# Patient Record
Sex: MALE | Race: BLACK OR AFRICAN AMERICAN | Employment: FULL TIME | URBAN - METROPOLITAN AREA
[De-identification: names, ages, dates, MRNs, and addresses within clinical notes are randomized per-mention and may not be internally consistent; named-entity substitution may affect disease eponyms.]

---

## 2021-10-12 ENCOUNTER — HOSPITAL ENCOUNTER (OUTPATIENT)
Dept: GENERAL RADIOLOGY | Age: 31
Discharge: HOME | End: 2021-10-14
Payer: COMMERCIAL

## 2021-10-12 ENCOUNTER — HOSPITAL ENCOUNTER (OUTPATIENT)
Age: 31
Discharge: HOME | End: 2021-10-14
Payer: COMMERCIAL

## 2021-10-12 DIAGNOSIS — M25.561 RIGHT KNEE PAIN, UNSPECIFIED CHRONICITY: ICD-10-CM

## 2021-10-12 PROCEDURE — 73562 X-RAY EXAM OF KNEE 3: CPT

## 2022-05-31 ENCOUNTER — HOSPITAL ENCOUNTER (OUTPATIENT)
Age: 32
Setting detail: OBSERVATION
Discharge: HOME | End: 2022-06-01
Attending: GENERAL PRACTICE | Admitting: ORTHOPAEDIC SURGERY
Payer: COMMERCIAL

## 2022-05-31 ENCOUNTER — APPOINTMENT (OUTPATIENT)
Dept: GENERAL RADIOLOGY | Age: 32
End: 2022-05-31
Payer: COMMERCIAL

## 2022-05-31 ENCOUNTER — ANESTHESIA EVENT (OUTPATIENT)
Dept: OPERATING ROOM | Age: 32
End: 2022-05-31
Payer: COMMERCIAL

## 2022-05-31 ENCOUNTER — ANESTHESIA (OUTPATIENT)
Dept: OPERATING ROOM | Age: 32
End: 2022-05-31
Payer: COMMERCIAL

## 2022-05-31 DIAGNOSIS — S68.021A: Primary | ICD-10-CM

## 2022-05-31 PROBLEM — S65.311A LACERATION OF DEEP PALMAR ARCH OF RIGHT HAND: Status: ACTIVE | Noted: 2022-05-31

## 2022-05-31 PROBLEM — S68.521A: Status: ACTIVE | Noted: 2022-05-31

## 2022-05-31 PROBLEM — S65.411A: Status: ACTIVE | Noted: 2022-05-31

## 2022-05-31 LAB
ABO/RH: NORMAL
ALBUMIN SERPL-MCNC: 4.4 G/DL (ref 3.5–5.2)
ALBUMIN SERPL-MCNC: 4.7 G/DL (ref 3.5–5.2)
ALP BLD-CCNC: 45 U/L (ref 40–129)
ALP BLD-CCNC: 55 U/L (ref 40–129)
ALT SERPL-CCNC: 54 U/L (ref 0–40)
ALT SERPL-CCNC: 63 U/L (ref 0–40)
ANION GAP SERPL CALCULATED.3IONS-SCNC: 11 MMOL/L (ref 7–16)
ANION GAP SERPL CALCULATED.3IONS-SCNC: 17 MMOL/L (ref 7–16)
ANTIBODY SCREEN: NORMAL
AST SERPL-CCNC: 30 U/L (ref 0–39)
AST SERPL-CCNC: 33 U/L (ref 0–39)
BASOPHILS ABSOLUTE: 0.06 E9/L (ref 0–0.2)
BASOPHILS RELATIVE PERCENT: 0.7 % (ref 0–2)
BILIRUB SERPL-MCNC: 0.4 MG/DL (ref 0–1.2)
BILIRUB SERPL-MCNC: <0.2 MG/DL (ref 0–1.2)
BUN BLDV-MCNC: 14 MG/DL (ref 6–20)
BUN BLDV-MCNC: 17 MG/DL (ref 6–20)
CALCIUM SERPL-MCNC: 8.2 MG/DL (ref 8.6–10.2)
CALCIUM SERPL-MCNC: 9.6 MG/DL (ref 8.6–10.2)
CHLORIDE BLD-SCNC: 104 MMOL/L (ref 98–107)
CHLORIDE BLD-SCNC: 105 MMOL/L (ref 98–107)
CO2: 20 MMOL/L (ref 22–29)
CO2: 23 MMOL/L (ref 22–29)
CREAT SERPL-MCNC: 0.8 MG/DL (ref 0.7–1.2)
CREAT SERPL-MCNC: 1 MG/DL (ref 0.7–1.2)
EOSINOPHILS ABSOLUTE: 0.33 E9/L (ref 0.05–0.5)
EOSINOPHILS RELATIVE PERCENT: 4 % (ref 0–6)
GFR AFRICAN AMERICAN: >60
GFR AFRICAN AMERICAN: >60
GFR NON-AFRICAN AMERICAN: >60 ML/MIN/1.73
GFR NON-AFRICAN AMERICAN: >60 ML/MIN/1.73
GLUCOSE BLD-MCNC: 131 MG/DL (ref 74–99)
GLUCOSE BLD-MCNC: 143 MG/DL (ref 74–99)
HCT VFR BLD CALC: 41.7 % (ref 37–54)
HEMOGLOBIN: 14.8 G/DL (ref 12.5–16.5)
IMMATURE GRANULOCYTES #: 0.03 E9/L
IMMATURE GRANULOCYTES %: 0.4 % (ref 0–5)
INR BLD: 1.1
LYMPHOCYTES ABSOLUTE: 3.22 E9/L (ref 1.5–4)
LYMPHOCYTES RELATIVE PERCENT: 39.4 % (ref 20–42)
MCH RBC QN AUTO: 30 PG (ref 26–35)
MCHC RBC AUTO-ENTMCNC: 35.5 % (ref 32–34.5)
MCV RBC AUTO: 84.4 FL (ref 80–99.9)
MONOCYTES ABSOLUTE: 0.74 E9/L (ref 0.1–0.95)
MONOCYTES RELATIVE PERCENT: 9 % (ref 2–12)
NEUTROPHILS ABSOLUTE: 3.8 E9/L (ref 1.8–7.3)
NEUTROPHILS RELATIVE PERCENT: 46.5 % (ref 43–80)
PDW BLD-RTO: 12.4 FL (ref 11.5–15)
PLATELET # BLD: 241 E9/L (ref 130–450)
PMV BLD AUTO: 10.7 FL (ref 7–12)
POTASSIUM REFLEX MAGNESIUM: 3.7 MMOL/L (ref 3.5–5)
POTASSIUM SERPL-SCNC: 4.2 MMOL/L (ref 3.5–5)
PROTHROMBIN TIME: 11.4 SEC (ref 9.3–12.4)
RBC # BLD: 4.94 E12/L (ref 3.8–5.8)
SODIUM BLD-SCNC: 139 MMOL/L (ref 132–146)
SODIUM BLD-SCNC: 141 MMOL/L (ref 132–146)
TOTAL PROTEIN: 6.7 G/DL (ref 6.4–8.3)
TOTAL PROTEIN: 7.7 G/DL (ref 6.4–8.3)
WBC # BLD: 8.2 E9/L (ref 4.5–11.5)

## 2022-05-31 PROCEDURE — 86850 RBC ANTIBODY SCREEN: CPT

## 2022-05-31 PROCEDURE — 90471 IMMUNIZATION ADMIN: CPT | Performed by: GENERAL PRACTICE

## 2022-05-31 PROCEDURE — 2580000003 HC RX 258: Performed by: GENERAL PRACTICE

## 2022-05-31 PROCEDURE — 2580000003 HC RX 258: Performed by: NURSE ANESTHETIST, CERTIFIED REGISTERED

## 2022-05-31 PROCEDURE — 11043 DBRDMT MUSC&/FSCA 1ST 20/<: CPT | Performed by: ORTHOPAEDIC SURGERY

## 2022-05-31 PROCEDURE — 2580000003 HC RX 258: Performed by: PHYSICAL THERAPY ASSISTANT

## 2022-05-31 PROCEDURE — C1763 CONN TISS, NON-HUMAN: HCPCS | Performed by: ORTHOPAEDIC SURGERY

## 2022-05-31 PROCEDURE — 80053 COMPREHEN METABOLIC PANEL: CPT

## 2022-05-31 PROCEDURE — 3700000000 HC ANESTHESIA ATTENDED CARE: Performed by: ORTHOPAEDIC SURGERY

## 2022-05-31 PROCEDURE — 64913 NRV RPR W/NRV ALGRFT EA ADDL: CPT | Performed by: ORTHOPAEDIC SURGERY

## 2022-05-31 PROCEDURE — 64912 NRV RPR W/NRV ALGRFT 1ST: CPT | Performed by: ORTHOPAEDIC SURGERY

## 2022-05-31 PROCEDURE — 26350 REPAIR FINGER/HAND TENDON: CPT | Performed by: ORTHOPAEDIC SURGERY

## 2022-05-31 PROCEDURE — 35207 RPR BLD VSL DIR HAND FINGER: CPT | Performed by: ORTHOPAEDIC SURGERY

## 2022-05-31 PROCEDURE — 6360000002 HC RX W HCPCS: Performed by: NURSE ANESTHETIST, CERTIFIED REGISTERED

## 2022-05-31 PROCEDURE — 99285 EMERGENCY DEPT VISIT HI MDM: CPT

## 2022-05-31 PROCEDURE — 6360000002 HC RX W HCPCS: Performed by: PHYSICAL THERAPY ASSISTANT

## 2022-05-31 PROCEDURE — 6370000000 HC RX 637 (ALT 250 FOR IP)

## 2022-05-31 PROCEDURE — 6360000002 HC RX W HCPCS

## 2022-05-31 PROCEDURE — 6360000002 HC RX W HCPCS: Performed by: ORTHOPAEDIC SURGERY

## 2022-05-31 PROCEDURE — 7100000001 HC PACU RECOVERY - ADDTL 15 MIN: Performed by: ORTHOPAEDIC SURGERY

## 2022-05-31 PROCEDURE — 7100000000 HC PACU RECOVERY - FIRST 15 MIN: Performed by: ORTHOPAEDIC SURGERY

## 2022-05-31 PROCEDURE — 86900 BLOOD TYPING SEROLOGIC ABO: CPT

## 2022-05-31 PROCEDURE — 96375 TX/PRO/DX INJ NEW DRUG ADDON: CPT

## 2022-05-31 PROCEDURE — 85610 PROTHROMBIN TIME: CPT

## 2022-05-31 PROCEDURE — 3600000005 HC SURGERY LEVEL 5 BASE: Performed by: ORTHOPAEDIC SURGERY

## 2022-05-31 PROCEDURE — 90714 TD VACC NO PRESV 7 YRS+ IM: CPT | Performed by: GENERAL PRACTICE

## 2022-05-31 PROCEDURE — 6370000000 HC RX 637 (ALT 250 FOR IP): Performed by: PHYSICAL THERAPY ASSISTANT

## 2022-05-31 PROCEDURE — 36415 COLL VENOUS BLD VENIPUNCTURE: CPT

## 2022-05-31 PROCEDURE — 26591 REPAIR MUSCLES OF HAND: CPT | Performed by: ORTHOPAEDIC SURGERY

## 2022-05-31 PROCEDURE — 86901 BLOOD TYPING SEROLOGIC RH(D): CPT

## 2022-05-31 PROCEDURE — 2500000003 HC RX 250 WO HCPCS: Performed by: EMERGENCY MEDICINE

## 2022-05-31 PROCEDURE — 3700000001 HC ADD 15 MINUTES (ANESTHESIA): Performed by: ORTHOPAEDIC SURGERY

## 2022-05-31 PROCEDURE — 2709999900 HC NON-CHARGEABLE SUPPLY: Performed by: ORTHOPAEDIC SURGERY

## 2022-05-31 PROCEDURE — G0378 HOSPITAL OBSERVATION PER HR: HCPCS

## 2022-05-31 PROCEDURE — 6360000002 HC RX W HCPCS: Performed by: GENERAL PRACTICE

## 2022-05-31 PROCEDURE — 99284 EMERGENCY DEPT VISIT MOD MDM: CPT | Performed by: SURGERY

## 2022-05-31 PROCEDURE — 3600000015 HC SURGERY LEVEL 5 ADDTL 15MIN: Performed by: ORTHOPAEDIC SURGERY

## 2022-05-31 PROCEDURE — 96376 TX/PRO/DX INJ SAME DRUG ADON: CPT

## 2022-05-31 PROCEDURE — 73130 X-RAY EXAM OF HAND: CPT

## 2022-05-31 PROCEDURE — 85025 COMPLETE CBC W/AUTO DIFF WBC: CPT

## 2022-05-31 PROCEDURE — 96374 THER/PROPH/DIAG INJ IV PUSH: CPT

## 2022-05-31 PROCEDURE — 2500000003 HC RX 250 WO HCPCS: Performed by: NURSE ANESTHETIST, CERTIFIED REGISTERED

## 2022-05-31 DEVICE — CONNECTOR NRV L15MM DIA3MM PORCINE EXTRACELLULAR MTRX: Type: IMPLANTABLE DEVICE | Status: FUNCTIONAL

## 2022-05-31 DEVICE — CONNECTOR NRV L15MM DIA2MM PORCINE EXTRACELLULAR MTRX: Type: IMPLANTABLE DEVICE | Status: FUNCTIONAL

## 2022-05-31 DEVICE — IMPLANTABLE DEVICE: Type: IMPLANTABLE DEVICE | Status: FUNCTIONAL

## 2022-05-31 RX ORDER — OXYCODONE HYDROCHLORIDE AND ACETAMINOPHEN 5; 325 MG/1; MG/1
1 TABLET ORAL EVERY 6 HOURS PRN
Status: DISCONTINUED | OUTPATIENT
Start: 2022-05-31 | End: 2022-05-31

## 2022-05-31 RX ORDER — LABETALOL HYDROCHLORIDE 5 MG/ML
10 INJECTION, SOLUTION INTRAVENOUS ONCE
Status: COMPLETED | OUTPATIENT
Start: 2022-05-31 | End: 2022-05-31

## 2022-05-31 RX ORDER — HYDRALAZINE HYDROCHLORIDE 20 MG/ML
10 INJECTION INTRAMUSCULAR; INTRAVENOUS
Status: DISCONTINUED | OUTPATIENT
Start: 2022-05-31 | End: 2022-05-31 | Stop reason: HOSPADM

## 2022-05-31 RX ORDER — MORPHINE SULFATE 2 MG/ML
2 INJECTION, SOLUTION INTRAMUSCULAR; INTRAVENOUS EVERY 5 MIN PRN
Status: DISCONTINUED | OUTPATIENT
Start: 2022-05-31 | End: 2022-05-31 | Stop reason: HOSPADM

## 2022-05-31 RX ORDER — FENTANYL CITRATE 50 UG/ML
INJECTION, SOLUTION INTRAMUSCULAR; INTRAVENOUS PRN
Status: DISCONTINUED | OUTPATIENT
Start: 2022-05-31 | End: 2022-05-31 | Stop reason: SDUPTHER

## 2022-05-31 RX ORDER — 0.9 % SODIUM CHLORIDE 0.9 %
1000 INTRAVENOUS SOLUTION INTRAVENOUS ONCE
Status: COMPLETED | OUTPATIENT
Start: 2022-05-31 | End: 2022-05-31

## 2022-05-31 RX ORDER — MORPHINE SULFATE 4 MG/ML
4 INJECTION, SOLUTION INTRAMUSCULAR; INTRAVENOUS ONCE
Status: COMPLETED | OUTPATIENT
Start: 2022-05-31 | End: 2022-05-31

## 2022-05-31 RX ORDER — ONDANSETRON 2 MG/ML
4 INJECTION INTRAMUSCULAR; INTRAVENOUS
Status: DISCONTINUED | OUTPATIENT
Start: 2022-05-31 | End: 2022-05-31 | Stop reason: HOSPADM

## 2022-05-31 RX ORDER — MEPERIDINE HYDROCHLORIDE 25 MG/ML
12.5 INJECTION INTRAMUSCULAR; INTRAVENOUS; SUBCUTANEOUS EVERY 5 MIN PRN
Status: DISCONTINUED | OUTPATIENT
Start: 2022-05-31 | End: 2022-05-31 | Stop reason: HOSPADM

## 2022-05-31 RX ORDER — HEPARIN SODIUM 10000 [USP'U]/ML
INJECTION, SOLUTION INTRAVENOUS; SUBCUTANEOUS PRN
Status: DISCONTINUED | OUTPATIENT
Start: 2022-05-31 | End: 2022-05-31 | Stop reason: ALTCHOICE

## 2022-05-31 RX ORDER — SODIUM CHLORIDE 9 MG/ML
INJECTION, SOLUTION INTRAVENOUS PRN
Status: DISCONTINUED | OUTPATIENT
Start: 2022-05-31 | End: 2022-06-01 | Stop reason: HOSPADM

## 2022-05-31 RX ORDER — IPRATROPIUM BROMIDE AND ALBUTEROL SULFATE 2.5; .5 MG/3ML; MG/3ML
1 SOLUTION RESPIRATORY (INHALATION)
Status: DISCONTINUED | OUTPATIENT
Start: 2022-05-31 | End: 2022-05-31 | Stop reason: HOSPADM

## 2022-05-31 RX ORDER — FENTANYL CITRATE 50 UG/ML
50 INJECTION, SOLUTION INTRAMUSCULAR; INTRAVENOUS ONCE
Status: COMPLETED | OUTPATIENT
Start: 2022-05-31 | End: 2022-05-31

## 2022-05-31 RX ORDER — LORAZEPAM 2 MG/ML
0.5 INJECTION INTRAMUSCULAR
Status: DISCONTINUED | OUTPATIENT
Start: 2022-05-31 | End: 2022-05-31 | Stop reason: HOSPADM

## 2022-05-31 RX ORDER — NEOSTIGMINE METHYLSULFATE 1 MG/ML
INJECTION, SOLUTION INTRAVENOUS PRN
Status: DISCONTINUED | OUTPATIENT
Start: 2022-05-31 | End: 2022-05-31 | Stop reason: SDUPTHER

## 2022-05-31 RX ORDER — ONDANSETRON 2 MG/ML
4 INJECTION INTRAMUSCULAR; INTRAVENOUS ONCE
Status: COMPLETED | OUTPATIENT
Start: 2022-05-31 | End: 2022-05-31

## 2022-05-31 RX ORDER — CEFAZOLIN SODIUM 1 G/3ML
INJECTION, POWDER, FOR SOLUTION INTRAMUSCULAR; INTRAVENOUS PRN
Status: DISCONTINUED | OUTPATIENT
Start: 2022-05-31 | End: 2022-05-31 | Stop reason: SDUPTHER

## 2022-05-31 RX ORDER — MIDAZOLAM HYDROCHLORIDE 1 MG/ML
INJECTION INTRAMUSCULAR; INTRAVENOUS PRN
Status: DISCONTINUED | OUTPATIENT
Start: 2022-05-31 | End: 2022-05-31 | Stop reason: SDUPTHER

## 2022-05-31 RX ORDER — SODIUM CHLORIDE 9 MG/ML
INJECTION, SOLUTION INTRAVENOUS CONTINUOUS PRN
Status: DISCONTINUED | OUTPATIENT
Start: 2022-05-31 | End: 2022-05-31 | Stop reason: SDUPTHER

## 2022-05-31 RX ORDER — MORPHINE SULFATE 2 MG/ML
2 INJECTION, SOLUTION INTRAMUSCULAR; INTRAVENOUS
Status: ACTIVE | OUTPATIENT
Start: 2022-05-31 | End: 2022-06-01

## 2022-05-31 RX ORDER — SODIUM CHLORIDE 0.9 % (FLUSH) 0.9 %
5-40 SYRINGE (ML) INJECTION EVERY 12 HOURS SCHEDULED
Status: DISCONTINUED | OUTPATIENT
Start: 2022-05-31 | End: 2022-06-01 | Stop reason: HOSPADM

## 2022-05-31 RX ORDER — FENTANYL CITRATE 50 UG/ML
INJECTION, SOLUTION INTRAMUSCULAR; INTRAVENOUS
Status: COMPLETED
Start: 2022-05-31 | End: 2022-05-31

## 2022-05-31 RX ORDER — ASPIRIN 325 MG
325 TABLET, DELAYED RELEASE (ENTERIC COATED) ORAL DAILY
Qty: 30 TABLET | Refills: 0 | Status: SHIPPED | OUTPATIENT
Start: 2022-05-31 | End: 2022-06-30

## 2022-05-31 RX ORDER — LABETALOL HYDROCHLORIDE 5 MG/ML
10 INJECTION, SOLUTION INTRAVENOUS
Status: DISCONTINUED | OUTPATIENT
Start: 2022-05-31 | End: 2022-05-31 | Stop reason: HOSPADM

## 2022-05-31 RX ORDER — PROCHLORPERAZINE EDISYLATE 5 MG/ML
5 INJECTION INTRAMUSCULAR; INTRAVENOUS
Status: DISCONTINUED | OUTPATIENT
Start: 2022-05-31 | End: 2022-05-31 | Stop reason: HOSPADM

## 2022-05-31 RX ORDER — OXYCODONE HYDROCHLORIDE 10 MG/1
10 TABLET ORAL EVERY 4 HOURS PRN
Status: DISCONTINUED | OUTPATIENT
Start: 2022-05-31 | End: 2022-06-01 | Stop reason: HOSPADM

## 2022-05-31 RX ORDER — ROCURONIUM BROMIDE 10 MG/ML
INJECTION, SOLUTION INTRAVENOUS PRN
Status: DISCONTINUED | OUTPATIENT
Start: 2022-05-31 | End: 2022-05-31 | Stop reason: SDUPTHER

## 2022-05-31 RX ORDER — ONDANSETRON 2 MG/ML
INJECTION INTRAMUSCULAR; INTRAVENOUS PRN
Status: DISCONTINUED | OUTPATIENT
Start: 2022-05-31 | End: 2022-05-31 | Stop reason: SDUPTHER

## 2022-05-31 RX ORDER — SODIUM CHLORIDE 0.9 % (FLUSH) 0.9 %
5-40 SYRINGE (ML) INJECTION PRN
Status: DISCONTINUED | OUTPATIENT
Start: 2022-05-31 | End: 2022-06-01 | Stop reason: HOSPADM

## 2022-05-31 RX ORDER — KETOROLAC TROMETHAMINE 30 MG/ML
30 INJECTION, SOLUTION INTRAMUSCULAR; INTRAVENOUS
Status: DISCONTINUED | OUTPATIENT
Start: 2022-05-31 | End: 2022-05-31 | Stop reason: HOSPADM

## 2022-05-31 RX ORDER — DEXAMETHASONE SODIUM PHOSPHATE 10 MG/ML
INJECTION INTRAMUSCULAR; INTRAVENOUS PRN
Status: DISCONTINUED | OUTPATIENT
Start: 2022-05-31 | End: 2022-05-31 | Stop reason: SDUPTHER

## 2022-05-31 RX ORDER — DIPHENHYDRAMINE HYDROCHLORIDE 50 MG/ML
12.5 INJECTION INTRAMUSCULAR; INTRAVENOUS
Status: DISCONTINUED | OUTPATIENT
Start: 2022-05-31 | End: 2022-05-31 | Stop reason: HOSPADM

## 2022-05-31 RX ORDER — MORPHINE SULFATE 4 MG/ML
4 INJECTION, SOLUTION INTRAMUSCULAR; INTRAVENOUS
Status: ACTIVE | OUTPATIENT
Start: 2022-05-31 | End: 2022-06-01

## 2022-05-31 RX ORDER — TETANUS AND DIPHTHERIA TOXOIDS ADSORBED 2; 2 [LF]/.5ML; [LF]/.5ML
0.5 INJECTION INTRAMUSCULAR ONCE
Status: COMPLETED | OUTPATIENT
Start: 2022-05-31 | End: 2022-05-31

## 2022-05-31 RX ORDER — ONDANSETRON 4 MG/1
4 TABLET, ORALLY DISINTEGRATING ORAL EVERY 8 HOURS PRN
Status: DISCONTINUED | OUTPATIENT
Start: 2022-05-31 | End: 2022-06-01 | Stop reason: HOSPADM

## 2022-05-31 RX ORDER — ACETAMINOPHEN 325 MG/1
650 TABLET ORAL
Status: DISCONTINUED | OUTPATIENT
Start: 2022-05-31 | End: 2022-06-01 | Stop reason: HOSPADM

## 2022-05-31 RX ORDER — LIDOCAINE HYDROCHLORIDE 20 MG/ML
INJECTION, SOLUTION INTRAVENOUS PRN
Status: DISCONTINUED | OUTPATIENT
Start: 2022-05-31 | End: 2022-05-31 | Stop reason: SDUPTHER

## 2022-05-31 RX ORDER — CEPHALEXIN 500 MG/1
500 CAPSULE ORAL 4 TIMES DAILY
Qty: 40 CAPSULE | Refills: 0 | Status: SHIPPED | OUTPATIENT
Start: 2022-05-31 | End: 2022-06-10

## 2022-05-31 RX ORDER — FENTANYL CITRATE 50 UG/ML
75 INJECTION, SOLUTION INTRAMUSCULAR; INTRAVENOUS ONCE
Status: COMPLETED | OUTPATIENT
Start: 2022-05-31 | End: 2022-05-31

## 2022-05-31 RX ORDER — ONDANSETRON 2 MG/ML
4 INJECTION INTRAMUSCULAR; INTRAVENOUS EVERY 6 HOURS PRN
Status: DISCONTINUED | OUTPATIENT
Start: 2022-05-31 | End: 2022-06-01 | Stop reason: HOSPADM

## 2022-05-31 RX ORDER — PROPOFOL 10 MG/ML
INJECTION, EMULSION INTRAVENOUS PRN
Status: DISCONTINUED | OUTPATIENT
Start: 2022-05-31 | End: 2022-05-31 | Stop reason: SDUPTHER

## 2022-05-31 RX ORDER — GLYCOPYRROLATE 1 MG/5 ML
SYRINGE (ML) INTRAVENOUS PRN
Status: DISCONTINUED | OUTPATIENT
Start: 2022-05-31 | End: 2022-05-31 | Stop reason: SDUPTHER

## 2022-05-31 RX ORDER — ONDANSETRON 2 MG/ML
INJECTION INTRAMUSCULAR; INTRAVENOUS
Status: COMPLETED
Start: 2022-05-31 | End: 2022-05-31

## 2022-05-31 RX ORDER — OXYCODONE HYDROCHLORIDE AND ACETAMINOPHEN 5; 325 MG/1; MG/1
1 TABLET ORAL EVERY 6 HOURS PRN
Qty: 28 TABLET | Refills: 0 | Status: SHIPPED | OUTPATIENT
Start: 2022-05-31 | End: 2022-06-07

## 2022-05-31 RX ORDER — OXYCODONE HYDROCHLORIDE 5 MG/1
5 TABLET ORAL EVERY 4 HOURS PRN
Status: DISCONTINUED | OUTPATIENT
Start: 2022-05-31 | End: 2022-06-01 | Stop reason: HOSPADM

## 2022-05-31 RX ORDER — MORPHINE SULFATE 4 MG/ML
4 INJECTION, SOLUTION INTRAMUSCULAR; INTRAVENOUS ONCE
Status: DISCONTINUED | OUTPATIENT
Start: 2022-05-31 | End: 2022-06-01 | Stop reason: HOSPADM

## 2022-05-31 RX ADMIN — DEXAMETHASONE SODIUM PHOSPHATE 10 MG: 10 INJECTION INTRAMUSCULAR; INTRAVENOUS at 16:40

## 2022-05-31 RX ADMIN — ONDANSETRON 4 MG: 2 INJECTION INTRAMUSCULAR; INTRAVENOUS at 10:23

## 2022-05-31 RX ADMIN — Medication 2000 MG: at 10:10

## 2022-05-31 RX ADMIN — SODIUM CHLORIDE: 9 INJECTION, SOLUTION INTRAVENOUS at 16:22

## 2022-05-31 RX ADMIN — OXYCODONE HYDROCHLORIDE 10 MG: 10 TABLET ORAL at 22:57

## 2022-05-31 RX ADMIN — TETANUS AND DIPHTHERIA TOXOIDS ADSORBED 0.5 ML: 2; 2 INJECTION INTRAMUSCULAR at 10:11

## 2022-05-31 RX ADMIN — PROPOFOL 200 MG: 10 INJECTION, EMULSION INTRAVENOUS at 16:29

## 2022-05-31 RX ADMIN — FENTANYL CITRATE 50 MCG: 50 INJECTION, SOLUTION INTRAMUSCULAR; INTRAVENOUS at 17:30

## 2022-05-31 RX ADMIN — CEFAZOLIN 2000 MG: 1 INJECTION, POWDER, FOR SOLUTION INTRAMUSCULAR; INTRAVENOUS at 16:36

## 2022-05-31 RX ADMIN — FENTANYL CITRATE 100 MCG: 50 INJECTION, SOLUTION INTRAMUSCULAR; INTRAVENOUS at 16:29

## 2022-05-31 RX ADMIN — ACETAMINOPHEN 650 MG: 325 TABLET ORAL at 22:57

## 2022-05-31 RX ADMIN — ROCURONIUM BROMIDE 50 MG: 10 INJECTION, SOLUTION INTRAVENOUS at 16:29

## 2022-05-31 RX ADMIN — ONDANSETRON HYDROCHLORIDE 4 MG: 2 SOLUTION INTRAMUSCULAR; INTRAVENOUS at 18:45

## 2022-05-31 RX ADMIN — FENTANYL CITRATE 50 MCG: 50 INJECTION, SOLUTION INTRAMUSCULAR; INTRAVENOUS at 16:57

## 2022-05-31 RX ADMIN — FENTANYL CITRATE 100 MCG: 50 INJECTION, SOLUTION INTRAMUSCULAR; INTRAVENOUS at 17:51

## 2022-05-31 RX ADMIN — LABETALOL HYDROCHLORIDE 10 MG: 5 INJECTION INTRAVENOUS at 14:55

## 2022-05-31 RX ADMIN — Medication 3 MG: at 18:47

## 2022-05-31 RX ADMIN — FENTANYL CITRATE 50 MCG: 50 INJECTION, SOLUTION INTRAMUSCULAR; INTRAVENOUS at 12:45

## 2022-05-31 RX ADMIN — CEFAZOLIN 2000 MG: 2 INJECTION, POWDER, FOR SOLUTION INTRAMUSCULAR; INTRAVENOUS at 23:01

## 2022-05-31 RX ADMIN — FENTANYL CITRATE 75 MCG: 50 INJECTION, SOLUTION INTRAMUSCULAR; INTRAVENOUS at 10:12

## 2022-05-31 RX ADMIN — Medication 0.6 MG: at 18:47

## 2022-05-31 RX ADMIN — FENTANYL CITRATE 50 MCG: 50 INJECTION, SOLUTION INTRAMUSCULAR; INTRAVENOUS at 16:47

## 2022-05-31 RX ADMIN — OXYCODONE AND ACETAMINOPHEN 1 TABLET: 5; 325 TABLET ORAL at 15:30

## 2022-05-31 RX ADMIN — SODIUM CHLORIDE: 9 INJECTION, SOLUTION INTRAVENOUS at 18:30

## 2022-05-31 RX ADMIN — SODIUM CHLORIDE: 9 INJECTION, SOLUTION INTRAVENOUS at 17:12

## 2022-05-31 RX ADMIN — MORPHINE SULFATE 4 MG: 4 INJECTION, SOLUTION INTRAMUSCULAR; INTRAVENOUS at 10:22

## 2022-05-31 RX ADMIN — LIDOCAINE HYDROCHLORIDE 50 MG: 20 INJECTION, SOLUTION INTRAVENOUS at 16:29

## 2022-05-31 RX ADMIN — MIDAZOLAM 2 MG: 1 INJECTION INTRAMUSCULAR; INTRAVENOUS at 16:24

## 2022-05-31 RX ADMIN — SODIUM CHLORIDE 1000 ML: 9 INJECTION, SOLUTION INTRAVENOUS at 10:10

## 2022-05-31 ASSESSMENT — PAIN - FUNCTIONAL ASSESSMENT
PAIN_FUNCTIONAL_ASSESSMENT: PREVENTS OR INTERFERES SOME ACTIVE ACTIVITIES AND ADLS
PAIN_FUNCTIONAL_ASSESSMENT: 0-10

## 2022-05-31 ASSESSMENT — ENCOUNTER SYMPTOMS
NAUSEA: 0
EYE DISCHARGE: 0
EYE PAIN: 0
SORE THROAT: 0
SINUS PRESSURE: 0
COUGH: 0
SHORTNESS OF BREATH: 0
ABDOMINAL PAIN: 0
WHEEZING: 0
VOMITING: 0
DIARRHEA: 0
BACK PAIN: 0
EYE REDNESS: 0

## 2022-05-31 ASSESSMENT — PAIN DESCRIPTION - ORIENTATION
ORIENTATION: RIGHT

## 2022-05-31 ASSESSMENT — PAIN DESCRIPTION - DESCRIPTORS
DESCRIPTORS: BURNING;DISCOMFORT;HEAVINESS
DESCRIPTORS: SQUEEZING;STABBING;SHARP
DESCRIPTORS: THROBBING

## 2022-05-31 ASSESSMENT — PAIN DESCRIPTION - ONSET: ONSET: AWAKENED FROM SLEEP

## 2022-05-31 ASSESSMENT — PAIN DESCRIPTION - FREQUENCY: FREQUENCY: INTERMITTENT

## 2022-05-31 ASSESSMENT — PAIN DESCRIPTION - PAIN TYPE
TYPE: ACUTE PAIN
TYPE: ACUTE PAIN;SURGICAL PAIN

## 2022-05-31 ASSESSMENT — PAIN DESCRIPTION - LOCATION
LOCATION: HAND

## 2022-05-31 ASSESSMENT — PAIN SCALES - GENERAL
PAINLEVEL_OUTOF10: 7
PAINLEVEL_OUTOF10: 6
PAINLEVEL_OUTOF10: 9
PAINLEVEL_OUTOF10: 10

## 2022-05-31 ASSESSMENT — LIFESTYLE VARIABLES
SMOKING_STATUS: 1
SMOKING_STATUS: 0

## 2022-05-31 ASSESSMENT — PAIN DESCRIPTION - DIRECTION: RADIATING_TOWARDS: THUMB

## 2022-05-31 NOTE — H&P
History and Physical    Patient's Name/Date of Birth: Viola Perez / 1990 (29 y.o.)    Date: May 31, 2022     Chief Complaint: right hand laceration    HPI: 29-year-old right hand dominant male presents to [address] as a transfer from [facility] emergency department for right hand laceration occurred around 10 AM this morning. Patient reports while moving and lifting glass, the glass slipped between his fingers suffering a first webspace deep laceration. Patient reports projectile blood shooting from the wound with immediate pain and the inability to flex the IP joint of his thumb. Acutely, he reports sensation to his finger occluding thumb. Denies NTP. Patient was given Ancef and tetanus at [facility] emergency department. Denies acute fever, chills, nausea, vomiting, chest pain shortness of breath. Denies any other musculoskeletal injury. History reviewed. No pertinent past medical history. History reviewed. No pertinent surgical history. Prior to Admission medications    Medication Sig Start Date End Date Taking? Authorizing Provider   naproxen (NAPROSYN) 500 MG tablet Take 1 tablet by mouth 2 times daily for 10 days. 8/25/14 9/4/14  RADHA Puri CNP       Allergies   Allergen Reactions    Penicillins Hives       History reviewed. No pertinent family history.     Social History     Socioeconomic History    Marital status: Single     Spouse name: Not on file    Number of children: Not on file    Years of education: Not on file    Highest education level: Not on file   Occupational History    Not on file   Tobacco Use    Smoking status: Never Smoker    Smokeless tobacco: Never Used   Substance and Sexual Activity    Alcohol use: Yes     Comment: occ    Drug use: Yes     Types: Marijuana Trent Jock)    Sexual activity: Never   Other Topics Concern    Not on file   Social History Narrative    Not on file     Social Determinants of Health     Financial Resource Strain:     Difficulty of Paying Living Expenses: Not on file   Food Insecurity:     Worried About Running Out of Food in the Last Year: Not on file    Roberta of Food in the Last Year: Not on file   Transportation Needs:     Lack of Transportation (Medical): Not on file    Lack of Transportation (Non-Medical):  Not on file   Physical Activity:     Days of Exercise per Week: Not on file    Minutes of Exercise per Session: Not on file   Stress:     Feeling of Stress : Not on file   Social Connections:     Frequency of Communication with Friends and Family: Not on file    Frequency of Social Gatherings with Friends and Family: Not on file    Attends Evangelical Services: Not on file    Active Member of [address] [place] [address] or Organizations: Not on file    Attends Club or Organization Meetings: Not on file    Marital Status: Not on file   Intimate Partner Violence:     Fear of Current or Ex-Partner: Not on file    Emotionally Abused: Not on file    Physically Abused: Not on file    Sexually Abused: Not on file   Housing Stability:     Unable to Pay for Housing in the Last Year: Not on file    Number of [place] in the Last Year: Not on file    Unstable Housing in the Last Year: Not on file       Review of Systems:   CONSTITUTIONAL:  negative for  fevers, chills, sweats and fatigue  EYES:  negative for  double vision, blurred vision and visual disturbance  RESPIRATORY:  negative for  dry cough, wheezing and chest pain  CARDIOVASCULAR:  negative for  dyspnea, palpitations, edema  GASTROINTESTINAL:  negative for nausea, vomiting and change in bowel habits  GENITOURINARY:  negative for frequency, dysuria and urinary incontinence  MUSCULOSKELETAL:  positive for  pain, joint swelling and decreased range of motion  NEUROLOGICAL:  negative for headaches, dizziness and numbness    Physical Exam:  Vitals:    05/31/22 1014 05/31/22 1024 05/31/22 1200 05/31/22 1307   BP: (!) 131/90 (!) 149/71 (!) 155/90 (!) 141/82 Pulse:  94 66 86   Resp:  20 18 18   Temp:       SpO2:  97% 97% 99%   Weight:       Height:           EYES:  sclera clear and conjunctiva normal  LUNGS:  no increased work of breathing, good air exchange and no retractions  CARDIOVASCULAR:  normal apical pulses, no edema and pulses 2 plus all extermities bilaterally  ABDOMEN:  No scars, soft, non-distended, non-tender, no masses palpated, no hepatosplenomegally  SKIN:  no redness, warmth, or swelling and no jaundice  MUSCULOSKELETAL:    Right upper Extremity:  · Compressive dressing was removed to further examined skin and injury. · 6 cm deep laceration through the first webspace and traversing through the [facility] crease and traveling just proximal to the wrist crease. Active pulsatile blood ejecting out of the wound initially and further slowed with compression. Blood is bright in color. Volar aspect of the proximal phalanx exposed. Serrated and flexor tendon visualized. Complete injury of artery exposed. Cannot further assess if neuro injury is present. 2 cm laceration at the middle finger that is superficial in nature. Mild abrasions at the dorsal aspect of the hand. · Diffuse tenderness about the thumb  · Patient demonstrated ability to flex IP joint and adduct the thumb. Patient can fully extend and abduct the thumb. · Subjective sensation to the volar, radial, dorsal aspect of thumb. Insensate to the ulnar aspect of the thumb  · PIN/ulnar nerve function intact. Unable to appreciate reliable AIN and motor exam  · +Radial pulse, Brisk Cap refill +2 seconds, hand warm and perfused  · Sensation intact to touch in radial/ulnar/median nerve distributions to hand        Assessment:  Active Problems:    * No active hospital problems. *  Resolved Problems:    * No resolved hospital problems. *      Discussion  Risk, benefits and treatment options discussed with  Anil Rob. he has verbalized understanding of options.  The possibility of complications were also discussed to include but not limited to nerve damage, infection, problems with wound healing, vascular injury, chronic pain, stiffness, dysfunction, nonhealing of the bone, symptomatic hardware and/or its failure, need for subsequent surgery, dislocation, and blood clots as well as medical related problems and other problems not specifically discussed. Risk of anesthesia also discussed to include death. Post-op care, work, activity and restrictions which included the use of pain medication and possibility of using blood thinner post op were also discussed with  Amber Courtney  and he verbalized and agreed with the restrictions    Plan:  · Discussed with attending  · MELANIE ENRIQUEZ UANNAMARIE  · Plan for exploration of right hand wound with possible ligamentous and tendinous repair with Dr. Eddie Ferrara on 5/31/2022. · Maintain ice and elevation for swelling and pain control  · Pressure control per ED  · Pain Control per ED  · NPO diet effective now  · Treatment consent  · Preoperative antibiotics  · Hold anticoagulation  · Discussed with Dr. Eddie Ferrara    Electronically signed by Jacquelin Reeves DO on 5/31/22 at 2:49 PM EDT        I have seen and evaluated the patient and agree with the above assessment and plan on today's visit. I have performed the key components of the history and physical examination with significant findings of right thumb laceration with tendon, nerve, and arterial injury. Patient with uncontrolled bleeding. Plan for emergent surgical intervention. Severity of injury was explained to the patient. Potential loss of thumb, thumb function, and incomplete return of function explained. Plan for emergent surgical intervention for uncontrolled bleeding. I concur with the findings and plan as documented.       I explained the risks, benefits, alternatives and complications of surgery with the patient including but not limited to the risks of infection, possible damage to nerves, vessels, or tendons, stiffness, loss of range of motion, scar sensitivity, wound healing complications, worsening symptoms, possible need for therapy, as well as the possible need further surgery and unanticipated complications. The patient voiced understanding and all questions were answered. The patient elected to proceed with emergent surgical intervention.      Jessie Shepherd MD  5/31/2022

## 2022-05-31 NOTE — ANESTHESIA PRE PROCEDURE
Department of Anesthesiology  Preprocedure Note       Name:  Luciano Cm   Age:  29 y.o.  :  1990                                          MRN:  [id number]         Date:  2022      Surgeon: Rosy Helton):  Neville Turner MD    Procedure: Procedure(s):  RIGHT HAND EXPLORATION, FIRST WEB SPACE NEUROVASCULAR STRUCTURE WITH POSSIBLE LIGMENT AND TENDOR REPAIR    Medications prior to admission:   Prior to Admission medications    Medication Sig Start Date End Date Taking? Authorizing Provider   naproxen (NAPROSYN) 500 MG tablet Take 1 tablet by mouth 2 times daily for 10 days. 14  RADHA Madrigal - CNP       Current medications:    Current Facility-Administered Medications   Medication Dose Route Frequency Provider Last Rate Last Admin    oxyCODONE-acetaminophen (PERCOCET) 5-325 MG per tablet 1 tablet  1 tablet Oral Q6H PRN Dale Esqueda DO         Current Outpatient Medications   Medication Sig Dispense Refill    naproxen (NAPROSYN) 500 MG tablet Take 1 tablet by mouth 2 times daily for 10 days. 20 tablet 0       Allergies: Allergies   Allergen Reactions    Penicillins Hives       Problem List:  There is no problem list on file for this patient. Past Medical History:  History reviewed. No pertinent past medical history. Past Surgical History:  History reviewed. No pertinent surgical history.     Social History:    Social History     Tobacco Use    Smoking status: Never Smoker    Smokeless tobacco: Never Used   Substance Use Topics    Alcohol use: Yes     Comment: occ                                Counseling given: Not Answered      Vital Signs (Current):   Vitals:    22 1014 22 1024 22 1200 22 1307   BP: (!) 131/90 (!) 149/71 (!) 155/90 (!) 141/82   Pulse:  94 66 86   Resp:  20 18 18   Temp:       SpO2:  97% 97% 99%   Weight:       Height:                                                  BP Readings from Last 3 Encounters:   22 (!) 141/82 regular  Rate: normal           Beta Blocker:  Not on Beta Blocker         Neuro/Psych:   Negative Neuro/Psych ROS              GI/Hepatic/Renal: Neg GI/Hepatic/Renal ROS            Endo/Other:                      ROS comment: RIGHT HAND EXPLORATION, FIRST WEB SPACE NEUROVASCULAR STRUCTURE WITH POSSIBLE LIGMENT AND TENDOR REPAIR (Right ) Abdominal:             Vascular: negative vascular ROS. Other Findings:           Anesthesia Plan      general     ASA 2 - emergent     (#20 Right upper arm)  Induction: intravenous. MIPS: Postoperative opioids intended and Prophylactic antiemetics administered. Anesthetic plan and risks discussed with patient. Use of blood products discussed with patient whom consented to blood products. Plan discussed with attending.                     Cathy Patel RN   5/31/2022

## 2022-05-31 NOTE — ED PROVIDER NOTES
EOMI, sclera non icteric  ENT: Oropharynx clear, handling secretions,  Neck: Supple, full ROM, no stridor, no meningeal signs  Respiratory: Lungs clear to auscultation bilaterally, no wheezes, rales, or rhonchi. Not in respiratory distress  Cardiovascular:  Regular rate. Regular rhythm. No murmurs, no gallops, no rubs. 2+ distal pulses. Equal extremity pulses. Musculoskeletal: Moves all extremities x 4. Warm and well perfused, no clubbing, no cyanosis, no edema. Capillary refill <3 seconds    Right upper extremity: deep laceration through the first webspace on the right hand deep just proximal to the wrist crease. pulsatile bleeding from there area of the digital artery. Slows with direct pressure. Exposed phalanx of the thumb. Lacerated flexor tendon that is visualized. Diffuse thumb tenderness as well. Sensation intact. Radial pulse intact. Unable to flex the thumb. Median, radial, ulnar nerves intact. Skin: skin warm and dry. Neurologic: GCS 15  Psychiatric: Normal Affect          -------------------------------------------------- RESULTS -------------------------------------------------  I have personally reviewed all laboratory and imaging results for this patient. Results are listed below.      LABS: (Lab results interpreted by me)  Results for orders placed or performed during the hospital encounter of 05/31/22   CBC with Auto Differential   Result Value Ref Range    WBC 8.2 4.5 - 11.5 E9/L    RBC 4.94 3.80 - 5.80 E12/L    Hemoglobin 14.8 12.5 - 16.5 g/dL    Hematocrit 41.7 37.0 - 54.0 %    MCV 84.4 80.0 - 99.9 fL    MCH 30.0 26.0 - 35.0 pg    MCHC 35.5 (H) 32.0 - 34.5 %    RDW 12.4 11.5 - 15.0 fL    Platelets 296 167 - 413 E9/L    MPV 10.7 7.0 - 12.0 fL    Neutrophils % 46.5 43.0 - 80.0 %    Immature Granulocytes % 0.4 0.0 - 5.0 %    Lymphocytes % 39.4 20.0 - 42.0 %    Monocytes % 9.0 2.0 - 12.0 %    Eosinophils % 4.0 0.0 - 6.0 %    Basophils % 0.7 0.0 - 2.0 %    Neutrophils Absolute 3.80 1.80 - 7.30 E9/L    Immature Granulocytes # 0.03 E9/L    Lymphocytes Absolute 3.22 1.50 - 4.00 E9/L    Monocytes Absolute 0.74 0.10 - 0.95 E9/L    Eosinophils Absolute 0.33 0.05 - 0.50 E9/L    Basophils Absolute 0.06 0.00 - 0.20 E9/L   Comprehensive Metabolic Panel w/ Reflex to MG   Result Value Ref Range    Sodium 141 132 - 146 mmol/L    Potassium reflex Magnesium 3.7 3.5 - 5.0 mmol/L    Chloride 104 98 - 107 mmol/L    CO2 20 (L) 22 - 29 mmol/L    Anion Gap 17 (H) 7 - 16 mmol/L    Glucose 143 (H) 74 - 99 mg/dL    BUN 17 6 - 20 mg/dL    CREATININE 1.0 0.7 - 1.2 mg/dL    GFR Non-African American >60 >=60 mL/min/1.73    GFR African American >60     Calcium 9.6 8.6 - 10.2 mg/dL    Total Protein 7.7 6.4 - 8.3 g/dL    Albumin 4.7 3.5 - 5.2 g/dL    Total Bilirubin 0.4 0.0 - 1.2 mg/dL    Alkaline Phosphatase 55 40 - 129 U/L    ALT 63 (H) 0 - 40 U/L    AST 33 0 - 39 U/L   ,       RADIOLOGY:  Interpreted by Radiologist unless otherwise specified  XR HAND RIGHT (MIN 3 VIEWS)   Final Result   Suggested soft tissue wound. No definite fracture or dislocation.               ------------------------- NURSING NOTES AND VITALS REVIEWED ---------------------------   The nursing notes within the ED encounter and vital signs as below have been reviewed by myself  BP (!) 141/82   Pulse 86   Temp 97.5 °F (36.4 °C)   Resp 18   Ht 5' 7\" (1.702 m)   Wt 220 lb (99.8 kg)   SpO2 99%   BMI 34.46 kg/m²     Oxygen Saturation Interpretation: Normal    The patients available past medical records and past encounters were reviewed.         ------------------------------ ED COURSE/MEDICAL DECISION MAKING----------------------  Medications   oxyCODONE-acetaminophen (PERCOCET) 5-325 MG per tablet 1 tablet (has no administration in time range)   0.9 % sodium chloride bolus (0 mLs IntraVENous Stopped 5/31/22 1308)   ceFAZolin (ANCEF) 2000 mg in sterile water 20 mL IV syringe (2,000 mg IntraVENous Given 5/31/22 1010)   diptheria-tetanus toxoids ([facility] [address]) 2-2 LF/0.5ML injection 0.5 mL (0.5 mLs IntraMUSCular Given 5/31/22 1011)   fentaNYL (SUBLIMAZE) injection 75 mcg (75 mcg IntraVENous Given 5/31/22 1012)   morphine sulfate (PF) injection 4 mg (4 mg IntraVENous Given 5/31/22 1022)   ondansetron (ZOFRAN) injection 4 mg (4 mg IntraVENous Given 5/31/22 1023)   fentaNYL (SUBLIMAZE) injection 50 mcg (50 mcg IntraVENous Given 5/31/22 1245)        IDr. Get, am the primary provider of record    Medical Decision Making:   Deep hand laceration with arterial bleeding with concerns for tendon laceration. STAT hand surgery consult. STAT vascular consult. Bleeding improved/slowed with combination or intermittent tourniquet and direct pressure. Already received antibiotics. To OR with Hand surgery      Oxygen Saturation Interpretation: 100 % on RA. Re-Evaluations:       No change      This patient's ED course included: a personal history and physicial examination, re-evaluation prior to disposition, multiple bedside re-evaluations, IV medications, cardiac monitoring, continuous pulse oximetry and complex medical decision making and emergency management    This patient has remained hemodynamically stable during their ED course. Consultations:  Ortho  Vascular surgery    CRITICAL CARE:  Please note that the withdrawal or failure to initiate urgent interventions for this patient would likely result in a life threatening deterioration or permanent disability. Accordingly this patient received 30 minutes of critical care time, including coordination of care, and direct bedside care and excluding separately billable procedures. Counseling: The emergency provider has spoken with the patient and discussed todays results, in addition to providing specific details for the plan of care and counseling regarding the diagnosis and prognosis.   Questions are answered at this time and they are agreeable with the plan.       --------------------------------- IMPRESSION AND DISPOSITION ---------------------------------    IMPRESSION  1. 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  Every effort was made to ensure accuracy; however, inadvertent computerized transcription errors may be present       Mayo José MD  05/31/22 1950

## 2022-05-31 NOTE — ED PROVIDER NOTES
ED  Provider Note  Admit Date/RoomTime: 5/31/2022  9:45 AM  ED Room: 07/07     HPI:   Kristin Garner is a 29 y.o. male presenting to the ED for thumb injury, beginning just prior to arrival.  History comes primarily from the patient. There is no problem list on file for this patient. .           The complaint has been persistent, moderate in severity, improved by nothing and worsened by nothing. Associated symptoms include none. Lauro Clemente was carrying a glass table when the table slipped and cut through his hand in the web spacing of his right hand between his thumb and his first finger. He noted immediate pain to the area as well as a large amount of blood loss. He became nauseous and diaphoretic from his pain and came to [address] [place] [address] ER for further evaluation of this partial amputation of his right thumb. Review of Systems   Constitutional: Negative for activity change, chills and fever. HENT: Negative for ear pain, sinus pressure and sore throat. Eyes: Negative for pain, discharge and redness. Respiratory: Negative for cough, shortness of breath and wheezing. Cardiovascular: Negative for chest pain. Gastrointestinal: Negative for abdominal pain, diarrhea, nausea and vomiting. Genitourinary: Negative for dysuria and frequency. Musculoskeletal: Positive for arthralgias, joint swelling and myalgias. Negative for back pain. Skin: Negative for rash and wound. Neurological: Negative for weakness and headaches. Hematological: Negative for adenopathy. All other systems reviewed and are negative. Physical Exam  Constitutional:       General: He is in acute distress. Appearance: He is well-developed. He is ill-appearing. He is not diaphoretic. HENT:      Head: Normocephalic and atraumatic. Mouth/Throat:      Dentition: Abnormal dentition. Eyes:      Pupils: Pupils are equal, round, and reactive to light.    Neck:      Vascular: No JVD.      Trachea: No tracheal deviation. Cardiovascular:      Rate and Rhythm: Regular rhythm. Heart sounds: No murmur heard. No friction rub. No gallop. Pulmonary:      Effort: Pulmonary effort is normal. No respiratory distress. Breath sounds: Normal breath sounds. No stridor. No wheezing or rales. Chest:      Chest wall: No tenderness. Abdominal:      General: Bowel sounds are normal. There is no distension. Palpations: Abdomen is soft. Tenderness: There is no abdominal tenderness. There is no guarding. Musculoskeletal:         General: Normal range of motion. Cervical back: Normal range of motion. Comments: Deep laceration into the base of the right thumb with arterial bleeding and avulsion of tissue. See associated imaging. Skin:     General: Skin is warm and dry. Neurological:      Mental Status: He is alert. Cranial Nerves: No cranial nerve deficit. Comments: Sensation is intact distal to the injury   Psychiatric:         Behavior: Behavior normal.                      Procedures     MDM  Number of Diagnoses or Management Options  Diagnosis management comments: Emergency department evaluation was notable for partial amputation of the right thumb. The injury is significant and cannot be adequately approximated at this facility. The center of the wound was packed with Surgicel and then the area of the laceration was reapproximated and then compressed together with a pressure dressing. Patient was treated with Ancef, Decavac, pain control and nausea control as well as IV fluids. No evidence of anemia on the patient's CBC. Vital signs of been stable while in the emergency department. Patient will be transferred to [facility] as a trauma consult. This information was relayed to the patient who understood this plan of care and was amenable to the plan.   Patient was discussed with Dr. Raghu Reyes at [facility] emergency department who did accept the patient in an ER to ER transfer               --------------------------------------------- PAST HISTORY ---------------------------------------------  Past Medical History:  has no past medical history on file. Past Surgical History:  has no past surgical history on file. Social History:  reports that he has never smoked. He has never used smokeless tobacco. He reports current alcohol use. He reports current drug use. Drug: Marijuana Diane Luciano). Family History: family history is not on file. The patients home medications have been reviewed.     Allergies: Penicillins    -------------------------------------------------- RESULTS -------------------------------------------------    Lab  Results for orders placed or performed during the hospital encounter of 05/31/22   CBC with Auto Differential   Result Value Ref Range    WBC 8.2 4.5 - 11.5 E9/L    RBC 4.94 3.80 - 5.80 E12/L    Hemoglobin 14.8 12.5 - 16.5 g/dL    Hematocrit 41.7 37.0 - 54.0 %    MCV 84.4 80.0 - 99.9 fL    MCH 30.0 26.0 - 35.0 pg    MCHC 35.5 (H) 32.0 - 34.5 %    RDW 12.4 11.5 - 15.0 fL    Platelets 489 316 - 857 E9/L    MPV 10.7 7.0 - 12.0 fL    Neutrophils % 46.5 43.0 - 80.0 %    Immature Granulocytes % 0.4 0.0 - 5.0 %    Lymphocytes % 39.4 20.0 - 42.0 %    Monocytes % 9.0 2.0 - 12.0 %    Eosinophils % 4.0 0.0 - 6.0 %    Basophils % 0.7 0.0 - 2.0 %    Neutrophils Absolute 3.80 1.80 - 7.30 E9/L    Immature Granulocytes # 0.03 E9/L    Lymphocytes Absolute 3.22 1.50 - 4.00 E9/L    Monocytes Absolute 0.74 0.10 - 0.95 E9/L    Eosinophils Absolute 0.33 0.05 - 0.50 E9/L    Basophils Absolute 0.06 0.00 - 0.20 E9/L   Comprehensive Metabolic Panel w/ Reflex to MG   Result Value Ref Range    Sodium 141 132 - 146 mmol/L    Potassium reflex Magnesium 3.7 3.5 - 5.0 mmol/L    Chloride 104 98 - 107 mmol/L    CO2 20 (L) 22 - 29 mmol/L    Anion Gap 17 (H) 7 - 16 mmol/L    Glucose 143 (H) 74 - 99 mg/dL    BUN 17 6 - 20 mg/dL CREATININE 1.0 0.7 - 1.2 mg/dL    GFR Non-African American >60 >=60 mL/min/1.73    GFR African American >60     Calcium 9.6 8.6 - 10.2 mg/dL    Total Protein 7.7 6.4 - 8.3 g/dL    Albumin 4.7 3.5 - 5.2 g/dL    Total Bilirubin 0.4 0.0 - 1.2 mg/dL    Alkaline Phosphatase 55 40 - 129 U/L    ALT 63 (H) 0 - 40 U/L    AST 33 0 - 39 U/L       Radiology  XR HAND RIGHT (MIN 3 VIEWS)   Final Result   Suggested soft tissue wound. No definite fracture or dislocation.             ------------------------- NURSING NOTES AND VITALS REVIEWED ---------------------------  Date / Time Roomed:  5/31/2022  9:45 AM  ED Bed Assignment:  MARY JO/MARY JO    The nursing notes within the ED encounter and vital signs as below have been reviewed. Patient Vitals for the past 24 hrs:   BP Temp Pulse Resp SpO2 Height Weight   05/31/22 1024 (!) 149/71 -- 94 20 97 % -- --   05/31/22 1014 (!) 131/90 -- -- -- -- -- --   05/31/22 0953 -- -- -- -- -- 5' 7\" (1.702 m) 220 lb (99.8 kg)   05/31/22 0946 104/82 -- (!) 112 18 95 % -- --   05/31/22 0945 -- 97.5 °F (36.4 °C) -- -- -- -- --       Oxygen Saturation Interpretation: Normal      ------------------------------------------ PROGRESS NOTES ------------------------------------------  Re-evaluation(s):  Time: 10:59 AM EDT. Patients symptoms show no change  Repeat physical examination is not changed      I have spoken with the patient and discussed todays results, in addition to providing specific details for the plan of care and counseling regarding the diagnosis and prognosis. Their questions are answered at this time and they are agreeable with the plan. I have discussed the risks and benefits of transfer and they wish to proceed with the transfer. --------------------------------- ADDITIONAL PROVIDER NOTES ---------------------------------  Consultations:  Spoke with Dr. Vini Burks (Emergency Medicine). Discussed case. They will come to the ED to evaluate this patient.       Reason for transfer: need for higher level of care. This patient's ED course included: a personal history and physicial examination, re-evaluation prior to disposition, multiple bedside re-evaluations, IV medications and cardiac monitoring    This patient has remained hemodynamically stable during their ED course. Please note that the withdrawal or failure to initiate urgent interventions for this patient would likely result in a life threatening deterioration or permanent disability. Accordingly this patient received 30 minutes of critical care time, excluding separately billable procedures. Clinical Impression  1. Partial traumatic amputation of right thumb through metacarpophalangeal (MCP) joint, initial encounter          Disposition  Patient's disposition: Transfer to [facility] ED. Transferred by: ALS. Patient's condition is fair.        Dannie Fontanez 45., DO  Resident  05/31/22 1103

## 2022-05-31 NOTE — CONSULTS
Chief Complaint: Patient seen in the emergency room for evaluation of bleeding right hand/thumb laceration      HPI: This patient was working on a glass table, at home, the glass broke, lacerated the right hand, near the base of the thumb with almost 60 to 70% circumference, with profuse active pulsatile bleeding, controlled with application of a blood pressure cuff and bandage, seen by ER physician Dr. Trisha Resendiz, who has requested me to evaluate the patient, informed me that the orthopedic service, resident also is there in the room evaluating the patient    When I came to see the patient, patient's fiancé was in the room with him with whom I discussed along with the orthopedic resident Dr. King Lloyd    I was informed, that the patient was initially seen in [facility] emergency department, received Ancef and tetanus injections and was transferred to the main campus      When I came to see him, patient in moderate distress, of the dressings being changed again, patient has received some fentanyl intravenously just a few minutes earlier    Persistent slow oozing noted from the laceration in the spite of the tourniquet application of the blood pressure cuff    Patient has no other injuries    Patient tells me has no major health issues    Patient does use marijuana    Patient does work full-time and is right-handed      Patient denies any focal lateralizing neurological symptoms like loss of speech, vision or loss of function of extremity    Patient can walk a few blocks without difficulty, and denies any symptoms of rest pain    Allergies   Allergen Reactions    Penicillins Hives       Current Facility-Administered Medications   Medication Dose Route Frequency Provider Last Rate Last Admin    fentaNYL (SUBLIMAZE) injection 50 mcg  50 mcg IntraVENous Once Bakari Gasca MD        fentaNYL (SUBLIMAZE) 100 MCG/2ML injection              Current Outpatient Medications   Medication Sig Dispense Refill    naproxen (NAPROSYN) 500 MG tablet Take 1 tablet by mouth 2 times daily for 10 days. 20 tablet 0       History reviewed. No pertinent past medical history. History reviewed. No pertinent surgical history. History reviewed. No pertinent family history. Social History     Socioeconomic History    Marital status: Single     Spouse name: Not on file    Number of children: Not on file    Years of education: Not on file    Highest education level: Not on file   Occupational History    Not on file   Tobacco Use    Smoking status: Never Smoker    Smokeless tobacco: Never Used   Substance and Sexual Activity    Alcohol use: Yes     Comment: occ    Drug use: Yes     Types: Marijuana Deidra Eglin)    Sexual activity: Never   Other Topics Concern    Not on file   Social History Narrative    Not on file     Social Determinants of Health     Financial Resource Strain:     Difficulty of Paying Living Expenses: Not on file   Food Insecurity:     Worried About Running Out of Food in the Last Year: Not on file    Roberta of Food in the Last Year: Not on file   Transportation Needs:     Lack of Transportation (Medical): Not on file    Lack of Transportation (Non-Medical):  Not on file   Physical Activity:     Days of Exercise per Week: Not on file    Minutes of Exercise per Session: Not on file   Stress:     Feeling of Stress : Not on file   Social Connections:     Frequency of Communication with Friends and Family: Not on file    Frequency of Social Gatherings with Friends and Family: Not on file    Attends Anabaptism Services: Not on file    Active Member of Clubs or Organizations: Not on file    Attends Club or Organization Meetings: Not on file    Marital Status: Not on file   Intimate Partner Violence:     Fear of Current or Ex-Partner: Not on file    Emotionally Abused: Not on file    Physically Abused: Not on file    Sexually Abused: Not on file   Housing Stability:     Unable to Pay for Housing in the Last Year: Not on file    Number of Places Lived in the Last Year: Not on file    Unstable Housing in the Last Year: Not on file       Review of Systems:  Skin:  No abnormal pigmentation or rash. Eyes:  No blurring, diplopia or vision loss. Ears/Nose/Throat:  No hearing loss or vertigo. Respiratory:  No cough, pleuritic chest pain, dyspnea, or wheezing. History of marijuana use    Cardiovascular: No angina, palpitations . Gastrointestinal:  No nausea or vomiting; no abdominal pain or rectal bleeding. Musculoskeletal:  No arthritis or weakness. Neurologic:  No paralysis, paresis, seizures or headaches. Hematologic/Lymphatic/Immunologic:  No anemia, abnormal bleeding/bruising. Endocrine:  No heat or cold intolerance. No polyphagia, polydipsia or polyuria. 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 Moderate distress due to pain at the laceration site, anxious and nervous  Skin:  Warm and dry. Head:  Normocephalic. No masses, lesions or tenderness. Eyes:  Conjunctivae appear normal; PERRL. Ears:  External ears normal.  Nose/Sinuses:  Septum midline, mucosa normal; no drainage. Oropharynx:  Clear, no exudate noted. Neck:  No jugular venous distention, lymphadenopathy or thyromegaly. No evidence of carotid bruit      Lungs:  Clear to ausculation bilaterally. No rhonchi, crackles, wheezes. Heart:  Regular rate and rhythm. No rub or murmur. .    Abdomen:  Soft, non-tender. No masses, organomegaly. Musculoskeletal: No joint effusions, tenderness swelling or warmth. Neuro: Speech is intact. Moving all extremities. No focal motor or sensory deficits. Extremities:  Both feet are warm to touch.  The color of both feet is normal.    Good radial pulses bilaterally, after deflation of the tourniquet on the blood pressure cuff    Pulsatile arterial bleeding noted bright red, consistent with laceration of the distal artery supplying the thumb, at least 1 digital arterial branch that is transected is definitely lacerated, second area slight arterial oozing noted, could not be identified clearly      Pulses Right  Left    Brachial 3 3    Radial 2-3 2-3  3=normal   Femoral 2 2  2=diminished   Popliteal    1=barely palpable   Dorsalis pedis    0=absent   Posterior tibial 2 2  4=aneurysmal           Other pertinent information:1. The past medical records were reviewed. 2.    Lab Results   Component Value Date    WBC 8.2 05/31/2022    HGB 14.8 05/31/2022    HCT 41.7 05/31/2022    MCV 84.4 05/31/2022     05/31/2022      Lab Results   Component Value Date     05/31/2022    K 3.7 05/31/2022     05/31/2022    CO2 20 (L) 05/31/2022    BUN 17 05/31/2022    CREATININE 1.0 05/31/2022    GLUCOSE 143 (H) 05/31/2022    CALCIUM 9.6 05/31/2022    PROT 7.7 05/31/2022    LABALBU 4.7 05/31/2022    BILITOT 0.4 05/31/2022    ALKPHOS 55 05/31/2022    AST 33 05/31/2022    ALT 63 (H) 05/31/2022    LABGLOM >60 05/31/2022    GFRAA >60 05/31/2022     No results found for: APTT   No results found for: INR, PROTIME     3.   XR HAND RIGHT (MIN 3 VIEWS)   Final Result   Suggested soft tissue wound. No definite fracture or dislocation. Assessment:      1. Laceration deep, right thumb at the base dominant right hand with arterial bleeding consistent with digital artery laceration    2.   History of marijuana use        Plan:           Discussed in detail with the patient and his fiancée in the room along with ER physician Dr. Kyle Haile, orthopedic resident Dr. Suni Grady    Informed them, since the patient is right-handed, dominant arm, involving the thumb, recommend hand surgery consultation for possible evaluation and consideration for microscopic repair of the digital artery    Vascular service will be glad to see the patient in surgery if necessary but will be glad to help in controlling the bleeding by ligating the distal artery but do not have the ability to repair the digital artery that will need microscopic surgical experience    Subsequently when I came back a second time, the seen the orthopedic resident, Dr. Talon Encinas was also there reevaluating the patient by deflating the tourniquet, noted pulsatile arterial bleeding    They are in the process of contacting their attending, to facilitate repair of the thumb laceration by the hand service      All the questions were answered.     Thank you for letting us participate in the care of your patient    Vascular service will be on standby in case of need      Electronically signed by Sharon Echols MD on 5/31/2022 at 1:14 PM

## 2022-05-31 NOTE — ANESTHESIA PRE PROCEDURE
Department of Anesthesiology  Preprocedure Note       Name:  Stephanie Toussaint   Age:  29 y.o.  :  1990                                          MRN:  [id number]         Date:  2022      Surgeon: Jaclyn Hawk):  Hebert Gonsales MD    Procedure: Procedure(s):  RIGHT HAND EXPLORATION, FIRST WEB SPACE NEUROVASCULAR STRUCTURE WITH POSSIBLE LIGMENT AND TENDOR REPAIR    Medications prior to admission:   Prior to Admission medications    Medication Sig Start Date End Date Taking? Authorizing Provider   naproxen (NAPROSYN) 500 MG tablet Take 1 tablet by mouth 2 times daily for 10 days. 14  RADHA Conde - CNP       Current medications:    Current Facility-Administered Medications   Medication Dose Route Frequency Provider Last Rate Last Admin    oxyCODONE-acetaminophen (PERCOCET) 5-325 MG per tablet 1 tablet  1 tablet Oral Q6H PRN Aranza Mathew DO         Current Outpatient Medications   Medication Sig Dispense Refill    naproxen (NAPROSYN) 500 MG tablet Take 1 tablet by mouth 2 times daily for 10 days. 20 tablet 0       Allergies: Allergies   Allergen Reactions    Penicillins Hives       Problem List:  There is no problem list on file for this patient. Past Medical History:  History reviewed. No pertinent past medical history. Past Surgical History:  History reviewed. No pertinent surgical history.     Social History:    Social History     Tobacco Use    Smoking status: Never Smoker    Smokeless tobacco: Never Used   Substance Use Topics    Alcohol use: Yes     Comment: occ                                Counseling given: Not Answered      Vital Signs (Current):   Vitals:    22 1014 22 1024 22 1200 22 1307   BP: (!) 131/90 (!) 149/71 (!) 155/90 (!) 141/82   Pulse:  94 66 86   Resp:  20 18 18   Temp:       SpO2:  97% 97% 99%   Weight:       Height:                                                  BP Readings from Last 3 Encounters:   22 (!) 141/82 08/17/16 132/80       NPO Status:   2359 5/3/22                                                                               BMI:   Wt Readings from Last 3 Encounters:   05/31/22 220 lb (99.8 kg)   08/17/16 215 lb (97.5 kg)     Body mass index is 34.46 kg/m². CBC:   Lab Results   Component Value Date    WBC 8.2 05/31/2022    RBC 4.94 05/31/2022    HGB 14.8 05/31/2022    HCT 41.7 05/31/2022    MCV 84.4 05/31/2022    RDW 12.4 05/31/2022     05/31/2022       CMP:   Lab Results   Component Value Date     05/31/2022    K 3.7 05/31/2022     05/31/2022    CO2 20 05/31/2022    BUN 17 05/31/2022    CREATININE 1.0 05/31/2022    GFRAA >60 05/31/2022    LABGLOM >60 05/31/2022    GLUCOSE 143 05/31/2022    PROT 7.7 05/31/2022    CALCIUM 9.6 05/31/2022    BILITOT 0.4 05/31/2022    ALKPHOS 55 05/31/2022    AST 33 05/31/2022    ALT 63 05/31/2022       POC Tests: No results for input(s): POCGLU, POCNA, POCK, POCCL, POCBUN, POCHEMO, POCHCT in the last 72 hours. Coags: No results found for: PROTIME, INR, APTT    HCG (If Applicable): No results found for: PREGTESTUR, PREGSERUM, HCG, HCGQUANT     ABGs: No results found for: PHART, PO2ART, OCW8SMB, VEF1SPY, BEART, K9IFWHXV     Type & Screen (If Applicable):  No results found for: LABABO, LABRH    Drug/Infectious Status (If Applicable):  No results found for: HIV, HEPCAB    COVID-19 Screening (If Applicable): No results found for: COVID19    XR right hand 5/31/22  Suggested soft tissue wound.  No definite fracture or dislocation. Anesthesia Evaluation  Patient summary reviewed and Nursing notes reviewed no history of anesthetic complications:   Airway: Mallampati: II  TM distance: >3 FB   Neck ROM: full  Mouth opening: > = 3 FB   Dental:      Comment: PT denies any missing or loose teeth    Pulmonary:Negative Pulmonary ROS breath sounds clear to auscultation      (-) not a current smoker          Patient did not smoke on day of surgery.                 ROS comment: Marijuana use   Cardiovascular:Negative CV ROS  Exercise tolerance: good (>4 METS),           Rhythm: regular  Rate: normal           Beta Blocker:  Not on Beta Blocker         Neuro/Psych:   Negative Neuro/Psych ROS              GI/Hepatic/Renal: Neg GI/Hepatic/Renal ROS            Endo/Other: Negative Endo/Other ROS                     ROS comment: right hand laceration Abdominal:             Vascular: negative vascular ROS. Other Findings:           Anesthesia Plan      general     ASA 2     (20g RAC PIV)  Induction: intravenous. Anesthetic plan and risks discussed with patient. Use of blood products discussed with patient whom consented to blood products. Plan discussed with CRNA and attending. Donal Milligan RN   5/31/2022    Chart reviewed, findings discussed with anesthesiologist . Anesthesia plan of care discussed with prabha.     Cristal Razo CRNA

## 2022-05-31 NOTE — CONSULTS
Department of Orthopedic Surgery  Resident Consult Note          Reason for Consult:  Right hand laceration    HISTORY OF PRESENT ILLNESS:    45-year-old right hand dominant male presents to [facility] department as a transfer from [facility] emergency department for right hand laceration occurred around 10 AM this morning. Patient reports while moving and lifting glass, the glass slipped between his fingers suffering a first webspace deep laceration. Patient reports projectile blood shooting from the wound with immediate pain and the inability to flex the IP joint of his thumb. Acutely, he reports sensation to his finger occluding thumb. Denies NTP. Patient was given Ancef and tetanus at [facility] emergency department. Denies acute fever, chills, nausea, vomiting, chest pain shortness of breath. Denies any other musculoskeletal injury. Tobacco use: None  Alcohol use: social drinker  Illicit drug use: occasional smoked marijuana    Past Medical History:    History reviewed. No pertinent past medical history. Past Surgical History:    History reviewed. No pertinent surgical history. Current Medications:   Current Facility-Administered Medications: fentaNYL (SUBLIMAZE) injection 50 mcg, 50 mcg, IntraVENous, Once  fentaNYL (SUBLIMAZE) 100 MCG/2ML injection, , ,   oxyCODONE-acetaminophen (PERCOCET) 5-325 MG per tablet 1 tablet, 1 tablet, Oral, Q6H PRN  Allergies:  Penicillins    Social History:   TOBACCO:   reports that he has never smoked. He has never used smokeless tobacco.  ETOH:   reports current alcohol use. DRUGS:   reports current drug use. Drug: Marijuana [place]). ACTIVITIES OF DAILY LIVING:    OCCUPATION:    Family History:   History reviewed. No pertinent family history.     REVIEW OF SYSTEMS:  CONSTITUTIONAL:  negative for  fevers, chills  EYES:  negative for blurred vision, visual disturbance  HEENT:  negative for  hearing loss, voice change  RESPIRATORY:  negative for  dyspnea, wheezing  CARDIOVASCULAR:  negative for  chest pain, palpitations  GASTROINTESTINAL:  negative for nausea, vomiting  GENITOURINARY:  negative for frequency, urinary incontinence  HEMATOLOGIC/LYMPHATIC:  negative for bleeding and petechiae  MUSCULOSKELETAL:  positive for  pain, joint swelling and decreased range of motion  NEUROLOGICAL:  negative for headaches, dizziness  BEHAVIOR/PSYCH:  negative for increased agitation and anxiety    PHYSICAL EXAM:    VITALS:  BP (!) 141/82   Pulse 86   Temp 97.5 °F (36.4 °C)   Resp 18   Ht 5' 7\" (1.702 m)   Wt 220 lb (99.8 kg)   SpO2 99%   BMI 34.46 kg/m²   CONSTITUTIONAL:  awake, alert, cooperative, moderate distress and appears stated age  MUSCULOSKELETAL:  Right upper Extremity:  · Compressive dressing was removed to further examined skin and injury. · 6 cm deep laceration through the first webspace and traversing through the [facility] crease and traveling just proximal to the wrist crease. Active pulsatile blood ejecting out of the wound initially and further slowed with compression. Blood is bright in color. Volar aspect of the proximal phalanx exposed. Serrated and flexor tendon visualized. Complete injury of artery exposed. Cannot further assess if neuro injury is present. 2 cm laceration at the middle finger that is superficial in nature. Mild abrasions at the dorsal aspect of the hand. · Diffuse tenderness about the thumb  · Patient demonstrated ability to flex IP joint and adduct the thumb. Patient can fully extend and abduct the thumb. · Subjective sensation to the volar, radial, dorsal aspect of thumb. Insensate to the ulnar aspect of the thumb  · PIN/ulnar nerve function intact. Unable to appreciate reliable AIN and motor exam  · +Radial pulse, Brisk Cap refill +2 seconds, hand warm and perfused  · Sensation intact to touch in radial/ulnar/median nerve distributions to hand    Secondary Exam:   · leftUE: No obvious signs of trauma.   -TTP to fingers, hand, wrist, forearm, elbow, humerus, shoulder or clavicle. -- Patient able to flex/extend fingers, wrist, elbow and shoulder with active and passive ROM without pain, +2/4 Radial pulse, cap refill <3sec, +AIN/PIN/Radial/Ulnar/Median N, distal sensation grossly intact to C4-T1 dermatomes, compartments soft and compressible. · bilateralLE: No obvious signs of trauma. -TTP to foot, ankle, leg, knee, thigh, hip.-- Patient able to flex/extend toes, ankle, knee and hip with active and passive ROM without pain,+2/4 DP & PT pulses, cap refill <3sec, +5/5 PF/DF/EHL, distal sensation grossly intact to L4-S1 dermatomes, compartments soft and compressible. · Pelvis: -TTP, -Log roll, -Heel strike     DATA:    CBC:   Lab Results   Component Value Date    WBC 8.2 05/31/2022    RBC 4.94 05/31/2022    HGB 14.8 05/31/2022    HCT 41.7 05/31/2022    MCV 84.4 05/31/2022    MCH 30.0 05/31/2022    MCHC 35.5 05/31/2022    RDW 12.4 05/31/2022     05/31/2022    MPV 10.7 05/31/2022     PT/INR:  No results found for: PROTIME, INR  CRP/ESR: No results found for: CRP, SEDRATE  Lactic Acid : No results found for: LACTA    Radiology Review:  05/31/22 - XR right hand demonstrate significant soft tissue injury about the thumb at the first webspace. No osseous injuries could be defined on plain imaging. Cannot appreciate dislocation at the IP, [facility] or MCP joint. No fracture dislocations of the hand noted      IMPRESSION:   · Right first webspace hand laceration    PLAN:   Discussed with attending   MELANIE MITCHELL   With verbal consent, tourniquet was applied to right upper extremity control bleeding and to further explore wound to determine exact source of bleeding. Wound was copiously irrigated with normal saline and Betadine. During exploration a pulsatile artery was identified, it was noted the artery had retracted and was unable to reach with sterile instrumentation.   Emergency department attending consulted vascular surgery to further assess definitive management regarding arterial injury. It was recommended by vascular surgery to transfer patient or defer definitive care to orthopedic hand surgery. Compression was applied to pulsatile bleeding for approximately 10 minutes while the tourniquet was deflated. When pressure was removed, pulsatile bleeding continued. Patient then was placed in a compressive dressing to further control bleeding and reevaluate in 10 to 15 minutes for reassessment. Upon second examination, compressive dressing was taken down in pulsatile artery was reviewed with active bleeding. Blood pressure was performed by ED staff and recorded 146/76. Again, compression was placed at the artery and just proximal to it to control bleeding for approximately 15 minutes. After 15 minutes, we were unable to control bleeding at that time and this was evidenced with dressing saturation. Sterile instrumentation was used to further clamp artery and attempt to ligate bleeding artery. Attempt was unsuccessful. New pressure was applied with then compressive dressing applied for further control of bleeding. We will continue with neurovascular assessment of the patient patient every 15 to 20 minutes.  Plan for exploration of right hand wound with possible ligamentous and tendinous repair with Dr. Oneill on 5/31/2022.  Maintain ice and elevation for swelling and pain control   Pressure control per ED   Pain Control per ED   NPO diet effective now   Treatment consent   Preoperative antibiotics   Hold anticoagulation  · Discussed with Dr. Oneill    I have explained the risks and complications of the recommended surgery with the patient at length, as well as discussed potential treatment alternatives including nonoperative management.  These risks include but are not limited to death or complication from anesthesia, continued pain, nerve tendon or vascular injury, infection, hematoma, deep vein thrombosis or pulmonary embolism, and need for further surgery, etc. Patient understood this, asked appropriate questions, which were all answered, and elected to proceed with the procedure.

## 2022-05-31 NOTE — ED NOTES
Access Line notified of pt to go to [facility] NORRIS Flores RN  05/31/22 [id number]

## 2022-06-01 VITALS
HEART RATE: 82 BPM | WEIGHT: 220 LBS | DIASTOLIC BLOOD PRESSURE: 73 MMHG | SYSTOLIC BLOOD PRESSURE: 110 MMHG | BODY MASS INDEX: 34.53 KG/M2 | OXYGEN SATURATION: 100 % | TEMPERATURE: 97 F | RESPIRATION RATE: 18 BRPM | HEIGHT: 67 IN

## 2022-06-01 PROCEDURE — 99999 PR OFFICE/OUTPT VISIT,PROCEDURE ONLY: CPT | Performed by: ORTHOPAEDIC SURGERY

## 2022-06-01 PROCEDURE — 6370000000 HC RX 637 (ALT 250 FOR IP): Performed by: PHYSICAL THERAPY ASSISTANT

## 2022-06-01 PROCEDURE — 96376 TX/PRO/DX INJ SAME DRUG ADON: CPT

## 2022-06-01 PROCEDURE — 2580000003 HC RX 258: Performed by: PHYSICAL THERAPY ASSISTANT

## 2022-06-01 PROCEDURE — 6360000002 HC RX W HCPCS: Performed by: PHYSICAL THERAPY ASSISTANT

## 2022-06-01 PROCEDURE — G0378 HOSPITAL OBSERVATION PER HR: HCPCS

## 2022-06-01 RX ADMIN — ASPIRIN 325 MG: 325 TABLET, COATED ORAL at 10:10

## 2022-06-01 RX ADMIN — CEFAZOLIN 2000 MG: 2 INJECTION, POWDER, FOR SOLUTION INTRAMUSCULAR; INTRAVENOUS at 10:10

## 2022-06-01 RX ADMIN — OXYCODONE HYDROCHLORIDE 10 MG: 10 TABLET ORAL at 04:48

## 2022-06-01 RX ADMIN — OXYCODONE HYDROCHLORIDE 10 MG: 10 TABLET ORAL at 11:02

## 2022-06-01 RX ADMIN — ACETAMINOPHEN 650 MG: 325 TABLET ORAL at 10:09

## 2022-06-01 RX ADMIN — SODIUM CHLORIDE, PRESERVATIVE FREE 10 ML: 5 INJECTION INTRAVENOUS at 10:14

## 2022-06-01 RX ADMIN — ACETAMINOPHEN 650 MG: 325 TABLET ORAL at 04:48

## 2022-06-01 ASSESSMENT — PAIN DESCRIPTION - ONSET: ONSET: ON-GOING

## 2022-06-01 ASSESSMENT — PAIN SCALES - GENERAL
PAINLEVEL_OUTOF10: 9
PAINLEVEL_OUTOF10: 7
PAINLEVEL_OUTOF10: 7

## 2022-06-01 ASSESSMENT — PAIN - FUNCTIONAL ASSESSMENT: PAIN_FUNCTIONAL_ASSESSMENT: PREVENTS OR INTERFERES SOME ACTIVE ACTIVITIES AND ADLS

## 2022-06-01 ASSESSMENT — PAIN DESCRIPTION - FREQUENCY: FREQUENCY: CONTINUOUS

## 2022-06-01 ASSESSMENT — PAIN DESCRIPTION - DESCRIPTORS: DESCRIPTORS: ACHING;THROBBING;DISCOMFORT

## 2022-06-01 ASSESSMENT — PAIN DESCRIPTION - PAIN TYPE: TYPE: SURGICAL PAIN

## 2022-06-01 ASSESSMENT — PAIN DESCRIPTION - ORIENTATION: ORIENTATION: RIGHT

## 2022-06-01 ASSESSMENT — PAIN DESCRIPTION - LOCATION: LOCATION: HAND

## 2022-06-01 NOTE — OP NOTE
[address]                  [address] [facility],  [facility]                                OPERATIVE REPORT    PATIENT NAME: Debora Beard                      :        1990  MED REC NO:   [id number]                            ROOM:       5424  ACCOUNT NO:   [de-identified]                           ADMIT DATE: 2022  PROVIDER:     Laura Wilkerson MD    DATE OF PROCEDURE:  2022    PREOPERATIVE DIAGNOSIS:  Right thumb laceration with tendon, nerve, and  arterial injury. POSTOPERATIVE DIAGNOSES:  1. Right thumb near amputation. 2.  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 3.  Laceration of adductor muscle of thumb. 4.  Laceration of abductor hallucis brevis muscle, right thumb. 5.  Flexor pollicis longus tendon laceration. 6.  Complex laceration of thumb. SURGERIES PERFORMED:  1. Excisional debridement of right thumb including skin, subcutaneous  tissues, and deep fascia. 2.  Right thumb complex wound closure measuring approximately 17 cm in  length. 3.  Right thumb microvascular revascularization of ulnar digital artery  with use of intraoperative microscope. 4.  Right radial digital nerve reconstruction using an AxoGen 2 to 3 mm  diameter allograft nerve bridging a gap of 15 mm with use of a 2 mm  conduit-assisted repair with use of intraoperative microscope. 5.  Right ulnar digital nerve reconstruction of a 15 mm AxoGen nerve  allograft repaired with 3 mm conduit-assisted repair with use of  intraoperative microscope. 6.  Repair of right thenar muscle involving the abductor pollicis  brevis. 7.  Repair of right thumb adductor muscle. 8.  Right thumb flexor pollicis longus tendon repair, 12-strand core  FiberWire suture repair achieved with double locking cruciate  anastomosis.   9.  Removal of foreign bodies within the zone of injury of the  laceration. ANESTHESIA:  General.    TOURNIQUET TIME:  Approximately 120 minutes. BLOOD LOSS:  Minimal.    SURGEON:  Eleni Mckinney MD    ASSISTANT:  Cecile Taveras DO, orthopedic surgery resident. FINDINGS:  1. Complex wound of the thumb extending directly through the  neurovascular bundles of flexor sheath extending down to the thumb  metacarpal bone in an oblique angled fashion severing the adductor  fascia and abductor pollicis brevis and thenar musculature. 2.  Status post wound exploration and excisional debridement, both the  radial and ulnar neurovascular bundles were severed. There was an  approximately _____ cm length defect to the nerves. There was complete  laceration of the fascia and laceration of flexor pollicis longus. 3.  Status post FPL tendon repair, there was a tension-free repair  achieved. Smooth gliding without gapping of the repaired tendon. The  adductor and thenar muscles were repaired using Vicryl suture repairing  the fascia with good reapproximation achieved. Intraoperative  microscope was used to achieve a conduit-assisted repair and  reconstruction with allograft nerve to both the radial and ulnar digital  nerves in a tension-free fashion. 4.  Status post microvascular repair of the ulnar digital artery, the  thumb had good warmth, turgor, and color and good capillary refill with  good flow across the repaired vessel. SPECIMENS:  None. COMPLICATIONS:  None. DISPOSITION:  The patient remained stable throughout the procedure. OPERATIVE INDICATIONS:  The patient is a 28-year-old gentleman who  sustained a severe injury to his right thumb. He was seen in an  outlying facility, had uncontrollable pulsatile bleeding, and was  transferred to [address] [place] [address]. Consultation was placed  in the ED for evaluation of the right thumb.   Severity of the injury was  explained to the patient including but not limited to the risk of loss  of thumb, incomplete recovery of function, incomplete recovery of  sensation, stiffness, need for therapy, possible additional surgery in  addition to blood loss, and potential adverse outcome. He voiced  understanding and wished to  proceed with emergent surgical  intervention. SURGERY IN DETAIL:  The patient was identified in the ER. He was taken  from the ER to  the operating suite, then underwent general anesthesia. All bony prominences were well padded. Arm tourniquet was placed and  the arm was exsanguinated in elevation prior to removal of the pressure  bandage followed by preparation of the right upper extremity in a  standard sterile fashion. He has had severe lacerations to palmar  aspect of the thumb at the level of the metacarpophalangeal joint. This  extended proximally into the thenar and adductor muscle fascia with  significant injury to the muscle and fascia skeletonizing the thumb  metacarpal volarly of the soft tissue. The zone of injury was extended  distally in a Brunner-type fashion and extended proximally along the  zone of injury along the thenar musculature. Proximally, the radial and  ulnar neurovascular bundles were identified. There was complete  severing of both the radial and ulnar digital arteries and nerves. There was a mutilating injury to the nerves. The distal nerves, targets  and vessel were identified. The ulnar digital artery was amenable to  repair. The FPL tendon was identified proximally within the thenar  musculature and _____ and held in place with a hypodermic syringe. Distally, the stump was able to be achieved underneath the oblique  pulley system. With the vital structures protected, excisional  debridement of the skin and subcutaneous tissues was performed with a  15-blade scalpel. There was kind of a metallic foreign material within  the soft tissues, these small metallic foreign bodies were removed.    This was then followed by copious amounts of irrigation. Attention was directed towards the adductor fascia. This was clearly  identified. After copious irrigation, the adductor fascia was repaired  with 0 Vicryl suture. Good approximation of the fascia to the adductor  muscle was achieved. Attention was then directed towards the thenar musculature. Abductor  pollicis brevis fascia was identified and amenable to repair with 0  Vicryl suture. The FPL tendon was then repaired with a double-locking cruciate  anastomosis with 4-0 looped FiberWire. Excellent score suture was  achieved followed by placement of a supplemental cruciate anastomosis  achieving a 12-strand repair. Status post repair, the thumb was flexed  and extended without any gapping. The microscope was then brought in. Under microscope magnification, the  nerve endings were trimmed out the fascicles using a tongue  depressor  and an 11 blade. This did result in approximately 15 mm gap to the  radial digital nerve. Therefore, a 2 to 3 mm diameter AxoGen allograft  nerve was selected. Buried length was 3 cm. This was soaked as per   's recommendations. Although the radial digital nerve was  smaller, a portion of the nerve was used first and sewn into place with  a 2-mm AxoGen nerve conduit or connector. This was trimmed in half,  one-half was used for the distal, another half was used for proximal  anastomosis. Distal anastomosis was achieved first with the thumb in  maximum extension. The microscope was used for placement of horizontal  docking stitch proximally and then distally followed by a second  horizontal mattress in the conduit-assisted repair to achieve a  tension-free repair of the anastomosis proximally in the fascicles  nicely.   A tongue depressor and an 11 blade were then brought back in,  trimmed the allograft nerve to length followed by the second nerve  connector again sewn into place in a similar fashion with 8-0 nylon  suture under microscopic magnification. Attention was then directed towards the ulnar digital nerve  reconstruction. This did have a smaller gap defect and this was bridged  with the remaining AxoGen allograft nerve. Again, the nerve fascicles  were trimmed to healthy fascicles with tongue depressor and an 11 blade. Under microscopic magnification, a conduit-assisted repair with 3-mm  nerve connectors was used to achieve a proximal and distal anastomosis  after the allograft nerve was trimmed to the size of defect. At this point, the tourniquet was deflated. There was excellent healthy  inflow of the ulnar digital artery. The proximal and distal stumps were  heparinized with irrigation. Under microscopic magnification, the  vessel was trimmed to healthy fibers followed by dilation and use of  microvascular clamps to achieve an anastomosis with 9-0 nylon suture  repairing the back wall followed by the front wall, removed the clamp,  confirming good patency across the repaired artery. At this point, the  microscope was removed. The wound was copiously irrigated out. There  was healthy brisk capillary refill to the thumb. The thumb was pink and  warm with good turgor and color. The zone of injury estimated about 17 cm was then repaired with multiple  4-0 nylon sutures. This was then followed by bulky sterile dressing  taking care not to compress the palmar soft tissues maintaining the  wrist in slight flexion and the thumb in flexion to protect the tendon  repair. The patient tolerated the procedure well and was  taken to the  recovery room.         Lillie Cannon MD    D: 05/31/2022 20:47:14       T: 06/01/2022 1:03:29     [email or web address]  Job#: [id number]     Doc#: [id number]    CC:

## 2022-06-01 NOTE — DISCHARGE SUMMARY
Physician Discharge Summary     Patient ID:  Emma Hernandez  [id number]  55 y.o.  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 Right thumb near amputation. 2.  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 3.  Laceration of adductor muscle of thumb. 4.  Laceration of abductor hallucis brevis muscle, right thumb. 5.  Flexor pollicis longus tendon laceration. 6.  Complex laceration of thumb. Admission Condition: serious    Discharged Condition: fair    Hospital Course: The patient was admitted from the Emergency room. After examination, review of radiologic studies, and appropriate pre-operative risk assessment, it was recommended by Jessie Shepherd MD to undergo 1. Excisional debridement of right thumb including skin, subcutaneous  tissues, and deep fascia. 2.  Right thumb complex wound closure measuring approximately 17 cm in length. 3.  Right thumb microvascular revascularization of ulnar digital artery with use of intraoperative microscope. 4.  Right radial digital nerve reconstruction using an AxoGen 2 to 3 mm diameter allograft nerve bridging a gap of 15 mm with use of a 2 mm conduit-assisted repair with use of intraoperative microscope. 5.  Right ulnar digital nerve reconstruction of a 15 mm AxoGen nerve allograft repaired with 3 mm conduit-assisted repair with use of intraoperative microscope. 6.  Repair of right thenar muscle involving the abductor pollicis brevis. 7.  Repair of right thumb adductor muscle.  8.  Right thumb flexor pollicis longus tendon repair, 12-strand core FiberWire suture repair achieved with double locking cruciate anastomosis. 9.  Removal of foreign bodies within the zone of injury of the  laceration. of the right UE. The patient underwent an uneventful course of the above mentioned procedures by Shanell Solis MD on 5/31/22. The patient was subsequently taken to the PACU and the floor in stable condition. The patient was continued on antibiotics for 24 hours post-operatively as they received a dose of antibiotics pre-operatively as well. The patient was started on DVT prophylaxis and physical therapy with instructions to be NWB.  was consulted for d/c planning. On POD 1, after the patient had made appropriate gains in regaining independent function with physical therapy, the patient was discharged to home in stable condition. Treatments:  1. Excisional debridement of right thumb including skin, subcutaneous  tissues, and deep fascia. 2.  Right thumb complex wound closure measuring approximately 17 cm in  length. 3.  Right thumb microvascular revascularization of ulnar digital artery  with use of intraoperative microscope. 4.  Right radial digital nerve reconstruction using an AxoGen 2 to 3 mm  diameter allograft nerve bridging a gap of 15 mm with use of a 2 mm  conduit-assisted repair with use of intraoperative microscope. 5.  Right ulnar digital nerve reconstruction of a 15 mm AxoGen nerve  allograft repaired with 3 mm conduit-assisted repair with use of  intraoperative microscope. 6.  Repair of right thenar muscle involving the abductor pollicis  brevis. 7.  Repair of right thumb adductor muscle. 8.  Right thumb flexor pollicis longus tendon repair, 12-strand core  FiberWire suture repair achieved with double locking cruciate  anastomosis. 9.  Removal of foreign bodies within the zone of injury of the  laceration.     Disposition: The patient was provided instructions to continue DVT prophylaxis as instructed, pain medication as prescribed, and to keep dressing clean, dry, and in tact. The patient was to follow up with Mynor Orellana MD in 1 week, and to call the office for an appointment. suture (s) were to be removed on within 2 weeks.       Signed:  Adama Barton DO  6/1/2022  3:40 PM

## 2022-06-01 NOTE — CARE COORDINATION
6/1. Plan is home with family.  Electronically signed by Humberto Cardenas RN on 6/1/2022 at 1:22 PM

## 2022-06-01 NOTE — ANESTHESIA POSTPROCEDURE EVALUATION
Department of Anesthesiology  Postprocedure Note    Patient: Josr Mays  MRN: [id number]  [place]: 1990  Date of evaluation: 5/31/2022  Time:  8:49 PM     Procedure Summary     Date: 05/31/22 Room / Location: [facility] OR 08 / [facility]    Anesthesia Start: 1628 Anesthesia Stop: 1927    Procedure: RIGHT HAND EXPLORATION, FIRST WEB SPACE NEUROVASCULAR STRUCTURE WITH POSSIBLE LIGMENT AND [address] (Right Hand) Diagnosis:       Laceration of right hand involving tendon, initial encounter      (right hand laceration)    Surgeons: Lorri Brand MD Responsible Provider:     Anesthesia Type: general ASA Status: 2          Anesthesia Type: general    Alaina Phase I: Alaina Score: 10    Alaina Phase II:      Last vitals: Reviewed and per EMR flowsheets.        Anesthesia Post Evaluation    Patient location during evaluation: PACU  Patient participation: complete - patient participated  Level of consciousness: awake  Airway patency: patent  Nausea & Vomiting: no nausea and no vomiting  Complications: no  Cardiovascular status: hemodynamically stable  Respiratory status: acceptable  Hydration status: euvolemic

## 2022-06-01 NOTE — PROGRESS NOTES
Discharge order noted. Checked with ortho resident if still need for medical management consult or if it may be cancelled. New orders noted.   Electronically signed by Gabi Johnson RN on 6/1/2022 at 8:01 AM
Patient seen and examine at bedside    Soft dressing saturated to the Kerlix layer. Ace bandage is not saturated. Thumb is slightly cold to the touch compared to other digits and contralateral thumb. Cap refill <2s. Radial pulse palpated. Subjective sensation to volar, dorsal and radial aspect of thumb. Lacks IP flexion and thumb adduction. Continue monitor neurovascular status. Plan as indicated in consult noted.
Patient seen and examine at bedside. Soft dressing remains saturated to the Kerlix layer. Ace bandage is not saturated. Thumb is slightly cold to the touch compared to other digits and contralateral thumb. Cap refill <2s. Radial pulse palpated. Subjective sensation to volar, dorsal and radial aspect of thumb. Lacks IP flexion and thumb adduction. Continue monitor neurovascular status. Plan as indicated in consult noted.
Pt. W/ laceration to first web space. Active pulsatile bleeding noted from ulnar aspect of laceration. Pt. Unable to flex at the IP or adduct the thumb. SILT on the radial aspect of the distal phalanx but not intact on the ulnar aspect. Direct pressure held over the wound for 15 minutes. When pressure relieved dressing continued to saturate. New pressure dressing applied. Labetalol for pressure control as BP elevated at this time, per ED. Plan for OR for exploration of right hand wound. Maintain NPO. Full plan/note to follow.      Vitals:    05/31/22 1307   BP: (!) 141/82   Pulse: 86   Resp: 18   Temp:    SpO2: 99%       Electronically signed by Lanette Garcia DO on 5/31/2022 at 2:16 PM
the zone of injury of the  laceration. PLAN      · Continue physical therapy and protocol: NWB - R UE, ok to perform finger ROM  · 24 hour abx coverage to be completed today  · PO keflex at discharge  · Deep venous thrombosis prophylaxis - asa 325 daily, early mobilization  · PT/OT  · Pain Control: IV and PO  · OK to dc today from orthopedic standpoint.  Follow up outpatient with Dr. Winifred Khan in 5-7 days    Electronically signed by Christopher Monk DO on 6/1/2022 at 7:40 AM

## 2022-06-07 ENCOUNTER — TREATMENT (OUTPATIENT)
Dept: OCCUPATIONAL THERAPY | Age: 32
End: 2022-06-07
Payer: COMMERCIAL

## 2022-06-07 ENCOUNTER — OFFICE VISIT (OUTPATIENT)
Dept: ORTHOPEDIC SURGERY | Age: 32
End: 2022-06-07

## 2022-06-07 VITALS — HEIGHT: 67 IN | BODY MASS INDEX: 34.53 KG/M2 | WEIGHT: 220 LBS

## 2022-06-07 DIAGNOSIS — S65.311A LACERATION OF DEEP PALMAR ARCH OF RIGHT HAND, INITIAL ENCOUNTER: Primary | ICD-10-CM

## 2022-06-07 PROCEDURE — 97760 ORTHOTIC MGMT&TRAING 1ST ENC: CPT | Performed by: OCCUPATIONAL THERAPIST

## 2022-06-07 PROCEDURE — 97110 THERAPEUTIC EXERCISES: CPT | Performed by: OCCUPATIONAL THERAPIST

## 2022-06-07 PROCEDURE — 99024 POSTOP FOLLOW-UP VISIT: CPT | Performed by: ORTHOPAEDIC SURGERY

## 2022-06-07 NOTE — PROGRESS NOTES
HPI: Patient presents today POD #6 of the below surgery. He presents early secondary to skin breakdown between his fingers. He denies any fever or chills. 1.  Excisional debridement of right thumb including skin, subcutaneous  tissues, and deep fascia. 2.  Right thumb complex wound closure measuring approximately 17 cm in  length. 3.  Right thumb microvascular revascularization of ulnar digital artery  with use of intraoperative microscope. 4.  Right radial digital nerve reconstruction using an AxoGen 2 to 3 mm  diameter allograft nerve bridging a gap of 15 mm with use of a 2 mm  conduit-assisted repair with use of intraoperative microscope. 5.  Right ulnar digital nerve reconstruction of a 15 mm AxoGen nerve  allograft repaired with 3 mm conduit-assisted repair with use of  intraoperative microscope. 6.  Repair of right thenar muscle involving the abductor pollicis  brevis. 7.  Repair of right thumb adductor muscle. 8.  Right thumb flexor pollicis longus tendon repair, 12-strand core  FiberWire suture repair achieved with double locking cruciate  anastomosis. 9.  Removal of foreign bodies within the zone of injury of the  laceration. Physical Exam: Incision intact with sutures in place. There is some mild maceration to the incision. Thumb is held in flexion. With gentle place and hold the patient is able to maintain flexion. Patient does have subjective sensation to the radial and ulnar digital nerve which is diminished but intact. No erythema or signs of infection. No drainage. There is maceration between the index and middle finger. No erythema or signs of infection. Brisk capillary refill. Otherwise her vascular intact    Assessment: POD #6    Plan:   Patient to follow-up on Tuesday for nurse visit for suture removal.    Therapist came to appointment at today's visit for splint fabrication. He will start formal therapy in [place]. Discussed active extension and passive flexion.   Wound care explained to the patient for between his fingers. Keep area clean and dry. Patient follow-up in 1 month. All questions and concerns answered. I have seen and evaluated the patient and agree with the above assessment and plan on today's visit. I have performed the key components of the history and physical examination with significant findings of postop. I concur with the findings and plan as documented.     Shireen Figueroa MD  6/7/2022

## 2022-06-07 NOTE — PROGRESS NOTES
OCCUPATIONAL THERAPY EVALUATION/Splint Application Only   Phone: [phone]   Fax: [phone]     Date:  2022      Patient Name:  Maria T Jose    :  1990    Restrictions/Precautions: Follow FPL tendon repair protocol. Low fall risk  Diagnosis:  Laceration of deep palmar arch of R hand, sx with multiple repairs including FPL, R ulnar and radial digital nerves, adductor muscle - for all see below. V05.386N (ICD-10-CM) - Laceration of deep palmar arch of right hand, initial encounter       Treatment Diagnosis:  same  Insurance/Certification information:  [employer]  Referring Physician:  Dr. Kady Root  Date of Surgery/Injury:   Sx 2022  ( one week post op)   Plan of care signed (Y/N):  N  Visit# / total visits:   ( pt to schedule his OT eval at our [facility] facility)    Past Medical History:   Past Medical History:   Diagnosis Date    Laceration of blood vessel of right thumb 2022    Laceration of deep palmar arch of right hand 2022     Past Surgical History:   Past Surgical History:   Procedure Laterality Date    HAND SURGERY Right 2022    RIGHT HAND EXPLORATION, FIRST WEB SPACE NEUROVASCULAR STRUCTURE WITH POSSIBLE LIGMENT AND TENDOR REPAIR performed by Annelise Childers MD at [address] [place] [address]       Reason for Referral:   On 2022 pt was carrying a glass table to and it slipped in his hand and he had a deep laceration in the web space of his R thumb. He went to the ED and was noted to have mutiple lacerations. He saw Dr. mEanuel Goodwin and had sx on 2022 with the following procedures:    PREOPERATIVE DIAGNOSIS:  Right thumb laceration with tendon, nerve, and  arterial injury.     POSTOPERATIVE DIAGNOSES:  1. Right thumb near amputation. 2.  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 3.  Laceration of adductor muscle of thumb.   4.  Laceration of abductor hallucis brevis muscle, right thumb. 5.  Flexor pollicis longus tendon laceration. 6.  Complex laceration of thumb.     SURGERIES PERFORMED:  1. Excisional debridement of right thumb including skin, subcutaneous  tissues, and deep fascia. 2.  Right thumb complex wound closure measuring approximately 17 cm in  length. 3.  Right thumb microvascular revascularization of ulnar digital artery  with use of intraoperative microscope. 4.  Right radial digital nerve reconstruction using an AxoGen 2 to 3 mm  diameter allograft nerve bridging a gap of 15 mm with use of a 2 mm  conduit-assisted repair with use of intraoperative microscope. 5.  Right ulnar digital nerve reconstruction of a 15 mm AxoGen nerve  allograft repaired with 3 mm conduit-assisted repair with use of  intraoperative microscope. 6.  Repair of right thenar muscle involving the abductor pollicis  brevis. 7.  Repair of right thumb adductor muscle. 8.  Right thumb flexor pollicis longus tendon repair, 12-strand core  FiberWire suture repair achieved with double locking cruciate  anastomosis. 9.  Removal of foreign bodies within the zone of injury of the  Laceration. He presents today for post op splint fabrication. He has his dressing off from seeing the Dr for his first post op appointment. Therapist redressed and let his air out for about 20 min as he had slight maceration. He will schedule his OT eval at out facility at [facility]. Splint Fabricated/Provided: DBS- holding his wrist in about 20* flexion, thumb MP flexed and IP extended. Finger free to move. Education Provided: Patient was provided with verbal education/handout regarding splint care, wear, precautions, and hygiene    Splint Care: Splint can be washed with mild soap and cool water. Splint needs to air dry. Avoid leaving splint in or near a source of heat such as a hot car or a space heater. Use nail polish remover to remove stains on splint.  Use Purell will decrease any odor that may develop. Splint Precuations: Patient was instructed to remove splint and contact therapist if the following occur:   1. Redness that lasts longer that 15-20 mins   2. Increased swelling   3. Increased pain   4. Pressure Sores    Wear Schedule: to wear 24/7. Can only remove to air out if he is sitting doing nothing. Splint Charge : Ortho fit x's 2 . There exercise x's 1. Profile and History- from pt and physician notes. Rehab Potential: Good   Recommendations: Outpatient OT  Goal Formulation: Patient  Requires OT Follow Up: Yes    Plan   Plan: Plan of care initiated. Pt was seen today for splint only. Pt may need an additional visit for splint modification. We will place pt on hold at this time until MD provides therapy clearance. Once therapy cleared, further assessment and goals will be completed. Goals (1 session):  1. Patient will verbalize and demo ability to don/doff splint appropriately in 1 session with good accuracy   Goal Met: Yes    2. Patient will verbalize understanding of splint precautions, splint care, and wear schedule in 1 session   Goal Met: Yes   3. Patient will demonstrate understand of stretching and/or exercise provided in 1 session. Goal Met: Yes. Pt will see OT for evaluation the end of this week or beginning of next to be instructed in all exercises. Therapist did go over precations of position to avoid and why he was given a splint to protect the sx site. The importance of allowing the sx site to heal with protection was enphasized. Pt appeared to understand but will be reviewed. Please review Patient's OT evaluation and if you agree sign/date and fax back to us at our [address] [place] [address]. fax # [phone].     Total Tx Time: 45 min   ( 30 min ortho fit and 15 min instructions/ exercise)     Physician's Signature:____________________________ Date:__________________       Juana Bates OT,/L, CHT  #[id number]

## 2022-06-07 NOTE — LETTER
[facility].  [address] [place] [address]  Phone: [phone]  Fax: [address] [facility], MD        June 7, 2022     Patient: Keila Martínez   YOB: 1990   Date of Visit: 6/7/2022       To Whom It May Concern: It is my medical opinion that Keila Martínez should remain out of work until 6/27/2022. If you have any questions or concerns, please don't hesitate to call.     Sincerely,        María Silverio MD

## 2022-06-08 NOTE — PROGRESS NOTES
OCCUPATIONAL THERAPY INITIAL EVALUATION    [address] THERAPY   [facility] [address] [place] [address]  Dept: [phone]: [phone]   Hiwot Wilkinson OT Fax: [phone]    Date:  2022  Initial Evaluation Date: 22   Evaluating Therapist: Stephanie Lee OT    Patient Name:  Chasity Bonilla    :  1990    Restrictions/Precautions: Follow FPL tendon repair protocol. Low fall risk  Diagnosis:  Laceration of deep palmar arch of R hand, sx with multiple repairs including FPL, R ulnar and radial digital nerves, adductor muscle - for all see below.    O76.967A (ICD-10-CM) - Laceration of deep palmar arch of right hand, initial encounter                                                     Insurance/Certification information:  [employer]    Date of Surgery/Injury:   Sx 2022  ( one week post op)   Plan of care signed (Y/N):  N  Visit# / total visits:      Referring Practitioner:  Dorothy Hernandez MD  Specific Practitioner Orders: Splint seymour, flexor tendon protocol, modalities prn    Assessment of current deficits   [] Functional mobility  [x] ADLs  [x] Strength   [] Cognition   [] Functional transfers   [x] IADLs  [] Safety Awareness  [x] Endurance   [x] Fine Motor Coordination  [] Balance  [] Vision/perception  [x] Sensation    [] Gross Motor Coordination [x] ROM  [x] Pain   [x] Edema    [x] Scar Adhesion/Skin Integrity     OT PLAN OF CARE   OT POC based on physician orders, patient diagnosis and results of clinical assessment    Frequency/Duration 2-3x a week for 18 visits  Specific OT Treatment to include:     [x] Instruction in HEP                   Modalities:  [x] Therapeutic Exercise        [x] Ultrasound               [] Electrical Stimulation/Attended  [x] PROM/Stretching                    [x] Fluidotherapy          [x]  Paraffin                   [x] AAROM  [x] AROM                 [] Iontophoresis:   [x] Tendon Glides                                               [] Neuromuscular Re-Ed            [] ADL/IADL re-training    [x] Therapeutic Activity       [x] Pain Management with/without modalities PRN                 [x] Manual Therapy                      [x] Splinting                      [x] Scar Management                   []Joint Protection/Training  []Ergonomics                             [x] Joint Mobilization        [] Adaptive Equipment Assessment/Training                             [x] Manual Edema Mobilization   [] Myofascial Release                 [] Energy Conservation/Work Simplification  [x] GM/FM Coordination       [] Safety retraining/education per  individual diagnosis/goals  [] Desensitization       Patient Specific Goal: to regain function of his R hand                             GOALS (Long term same as Short term):  1) Patient will demonstrate good understanding of home program (exercises/activities/diagnosis/prognosis/goals) with good accuracy. 2) Patient will demonstrate increased active/passive range of motion of their R thumb to [facility] for ADL/IADL completion. 3) Patient will demonstrate increased /pinch strength of at least 10 / 5 pinch pounds of their R hand. 4) Patient to report decreased pain in their affected R upper extremity from 8/10 to 2/10 or less with resistive functional use. 5) Increase in fine motor function as evidenced by decreased time to complete 9-hole peg test and/or MRMT test by at least 5-10 seconds. 6) Patient to report 100% compliance with their splint wear, care, and precautions if needed. 7) Patient will be knowledgeable of edema control techniques as evident with decreases from severe to mild/none. 8) Patient will demonstrate a non-tender/non-adherent scar. 9) Patient will report ADL functions as Mod I/I using RUE. 10) Patient will decrease QuickDASH score to 20% or less for increased participation in daily functional activities. Past Medical History:   Past Medical History:   Diagnosis Date    Laceration of blood vessel of right thumb 5/31/2022    Laceration of deep palmar arch of right hand 5/31/2022     Past Surgical History:   Past Surgical History:   Procedure Laterality Date    HAND SURGERY Right 5/31/2022    RIGHT HAND EXPLORATION, FIRST WEB SPACE NEUROVASCULAR STRUCTURE WITH POSSIBLE LIGMENT AND TENDOR REPAIR performed by Nadege Ta MD at [address] [place] [address]       Reason for Referral: Pt is a 29year old male who presents to occupational therapy services for initial evaluation. Pt suffered the  Injury on 5/31/22, pt was carrying a glass table when the table slipped and cut through his hand in the web spacing of his right hand between his thumb and his first finger. Pt presented to the emergency department where he was evaluated by emergency medicine and referred to Dr. Crista Beavers. Surgical intervention was performed on 5/31/22. Pt was placed in a brace on 6/7/22 and referred for initial occupational evaluation. 5/31/22 surgical report:  PREOPERATIVE DIAGNOSIS:  Right thumb laceration with tendon, nerve, and  arterial injury.     POSTOPERATIVE DIAGNOSES:  1.  Right thumb near amputation. 2.  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 3.  Laceration of adductor muscle of thumb. 4.  Laceration of abductor hallucis brevis muscle, right thumb. 5.  Flexor pollicis longus tendon laceration. 6.  Complex laceration of thumb.     SURGERIES PERFORMED:  1.  Excisional debridement of right thumb including skin, subcutaneous  tissues, and deep fascia. 2.  Right thumb complex wound closure measuring approximately 17 cm in  length. 3.  Right thumb microvascular revascularization of ulnar digital artery  with use of intraoperative microscope.   4.  Right radial digital nerve reconstruction using an AxoGen 2 to 3 mm  diameter allograft nerve bridging a gap of 15 mm with use of a 2 mm  conduit-assisted repair with use of intraoperative microscope. 5.  Right ulnar digital nerve reconstruction of a 15 mm AxoGen nerve  allograft repaired with 3 mm conduit-assisted repair with use of  intraoperative microscope. 6.  Repair of right thenar muscle involving the abductor pollicis  brevis. 7.  Repair of right thumb adductor muscle. 8.  Right thumb flexor pollicis longus tendon repair, 12-strand core  FiberWire suture repair achieved with double locking cruciate  anastomosis. 9.  Removal of foreign bodies within the zone of injury of the  Laceration. Home Living: lives with wife and 2 kids  Prior Level of Function: independent    Cognition:   Alert/Oriented x3     IADL STATUS:   Ind Mod I Min A Mod A Max A Dep Other   Homemaking Responsibility      x    Shopping Responsibility:      x    Mode of Transportation:   x       Leisure & Hobbies:   x       Work:       x     Comments: Pt is not currently able to help around the house with laundry or dishes or yard work but patient was sharing responsibilities with his wife prior to his injuries. Pt is driving since surgery but is being cautious regarding reaction time, precautions and when he takes his pain medication. 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 Pt enjoys hunting, fishing and hiking as well as being with his family. ADL STATUS:   Ind Mod I Min A Mod A Max A Dep Other   Feeding:  x        Grooming:  x        Bathing:  x        UE Dressing:  x        LE Dressing:   x       Toileting:  x        Transfers:  x          Comments: Pt reports using his non-dominant left hand for all ADL activities but currently needs help with fine motor tasks such as fasteners, buttons and zippers. Pain Level: moderate, throbbing, tight (pulling) and uncomfortable    UE Assessment:   Pt presents this date wearing his splint that was fabricated on 6/7/22 at another facility. Pt is tolerating brace well. Able to assess motion this date d/t severe swelling throughout the hand and and digits. Pt tolerated PROM of the IP and MP of the thumb well performed by therapist.     Wound care performed to the thumb incisions as well as between the digits-maseration and blistering present in between the fingers-heavily between the SF and RF. Pt states that he is moving his fingers and cleans between them but he was unable to control the blistering. When cleaning between the [facility] and -blister was seeping on cotton swab-brown and thick liquid. Pt instructed to continue to clean between each finger and to keep it dry. Call placed to referring physician office for direction on wound care for blisters. Right Upper Extremity ROM    FOREARM Pronation 80-90* NT       Supination 80-90* NT         WRIST Flexion 0-80* NT       Extension 0-70* NT       Radial Deviation 0-20* NT       Ulnar Deviation 0-30* NT        Right Hand ROM   AROM []         PROM[]       THUMB MP Extension/Flexion  14-23* H /0-50* NT       IP Extension/Flexion  23-30* H/ 0-80* NT       Radial Abduction 53-71* NT       Palmar Abduction 50-71* NT        Comment: Hand Dominance is right    Sensation: pain and throbbing noted at the tip of the thumb-pt states that a few days ago it felt numb but now it is throbbing    Edema Description/Circumferential Measurements:   Severe edema present at the wrist, thumb and throughout all digits of the hand. Pt encouraged to perform AROM of the fingers above the heart to reduce swelling as well as applied light compression gauze to the thumb and each of the fingers. Dynamometer (setting 2):     Left: NT      Right: NT    Pinch Meter:   Lateral: Left= NT,Right= NT    Palmar 3 point: Left= NT, Right= NT  9 Hole Peg Test:   Left: NT   Right: NT    QuickDASH Score: not assessed due to time    Intervention: wound care performed noted above-educated on wound care and importance of keeping the wound clean and dry.  Provided dressing change material to patient to perform wound care at home-educated patient if family members or patient did not feel comfortable to perform dressing changes at home-to call this facility and come in for a dressing change. Heavily educated patient on restrictions and precautions regarding flexor tendon protocol and recovery process. Pt is to move the fingers but not use the fingers of the R hand for any functional grasping or tasks at home. Educated patient on flexor tendon protocol (passive flexion, active extension) for each joint of the thumb (IP and MP) within the limits of the splint. Pt demonstrated and tolerated well-verbalized and demonstrated all instructions well. Eval Complexity: low  Profile and History- Chart reviewed  Assessment of Occupational Performance and Identification of Deficits- 10   Clinical Decision Making- no additional modifications required    Rehab Potential:                                 [x] Good  [] Fair  [] Poor        Suggested Professional Referral:       [x] No  [] Yes:  Barriers to Goal Achievement[de-identified]          [x] No  [] Yes:  Domestic Concerns:                           [x] No  [] Yes:       Patient. Education:  [x] Plans/Goals, Risks/Benefits discussed  [x] Home exercise program  Method of Education: [x] Verbal  [x] Demo  [x] Written  Comprehension of Education:  [x] Verbalizes understanding. [x] Demonstrates understanding. [x] Needs Review. [] Demonstrates/verbalizes understanding of HEP/Ed previously given.         Patient understands diagnosis/prognosis and consents to treatment, plan and goals: [x] Yes    [] No     Goal Formulation: Patient  Time In: 3:30            Time Out: 4:15                      Timed Code Treatment Minutes: 30 minutes      CODE  Minutes  Units   58507 OT Eval Low 15 1   61345 OT Eval Medium     75865 OT Eval High     24023 Fluidotherapy     11803 Manual 15 1   07779 Therapeutic Ex     69691 Therapeutic Activity 15 1   24813 ADL/COMP Tech Train     [id number] Neuromuscular Re-Ed     81525 OrthoManagementTraining     02897 [facility]     [id number] Electrical Stim - Attended     [id number] Iontophoresis     05594 Ultrasound      Other                Electronically signed by: Tarah Costello [place] #[id number]     [id number] Certification / Comments      Frequency/Duration  2-3 / week for 18 visits. Certification period From: 6/9/22  To: 9/9/22     I have reviewed the Plan of Care established for skilled therapy services and certify that the services are required and that they will be provided while the patient is under my care. Physician's Comments/Revisions:                   Physicians's Printed Name:  Josh Hodgkin MD                                   [de-identified] Signature:                                                               Date:      Please review Patient's OT evaluation and if you agree sign/date and fax back to us at our [facility] OT Fax: [phone].  Thank you for your referral!

## 2022-06-09 ENCOUNTER — EVALUATION (OUTPATIENT)
Dept: OCCUPATIONAL THERAPY | Age: 32
End: 2022-06-09
Payer: COMMERCIAL

## 2022-06-09 DIAGNOSIS — S65.311A LACERATION OF DEEP PALMAR ARCH OF RIGHT HAND, INITIAL ENCOUNTER: Primary | ICD-10-CM

## 2022-06-09 PROCEDURE — 97140 MANUAL THERAPY 1/> REGIONS: CPT | Performed by: OCCUPATIONAL THERAPIST

## 2022-06-09 PROCEDURE — 97165 OT EVAL LOW COMPLEX 30 MIN: CPT | Performed by: OCCUPATIONAL THERAPIST

## 2022-06-09 PROCEDURE — 97530 THERAPEUTIC ACTIVITIES: CPT | Performed by: OCCUPATIONAL THERAPIST

## 2022-06-10 DIAGNOSIS — S65.311A LACERATION OF DEEP PALMAR ARCH OF RIGHT HAND, INITIAL ENCOUNTER: Primary | ICD-10-CM

## 2022-06-10 RX ORDER — METHYLPREDNISOLONE 4 MG/1
TABLET ORAL
Qty: 1 KIT | Refills: 0 | Status: SHIPPED | OUTPATIENT
Start: 2022-06-10 | End: 2022-06-16

## 2022-06-13 ENCOUNTER — TREATMENT (OUTPATIENT)
Dept: OCCUPATIONAL THERAPY | Age: 32
End: 2022-06-13
Payer: COMMERCIAL

## 2022-06-13 DIAGNOSIS — S65.311A LACERATION OF DEEP PALMAR ARCH OF RIGHT HAND, INITIAL ENCOUNTER: Primary | ICD-10-CM

## 2022-06-13 PROCEDURE — 97140 MANUAL THERAPY 1/> REGIONS: CPT | Performed by: OCCUPATIONAL THERAPIST

## 2022-06-13 PROCEDURE — 97110 THERAPEUTIC EXERCISES: CPT | Performed by: OCCUPATIONAL THERAPIST

## 2022-06-13 NOTE — PROGRESS NOTES
OCCUPATIONAL THERAPY PROGRESS NOTE    Date:  2022  Initial Evaluation Date: 6/10/2022    Patient Name:  Ewa Groves    :  1990      Restrictions/Precautions:  Follow [employer] tendon repair protocol. Low fall risk  Diagnosis:  Laceration of deep palmar arch of R hand, sx with multiple repairs including F[employer], R ulnar and radial digital nerves, adductor muscle - for all see below.   S65.311A (ICD-10-CM) - Laceration of deep palmar arch of right hand, initial encounter                                                     Insurance/Certification information:  [employer]     Date of Surgery/Injury:   Sx 2022  ( 2 weeks post op tomorow)   Plan of care signed (Y/N):  N  Visit# / total visits:    ( No Orthofit approved, dated 2022 to 2022- 72 units each - 18 visits)      Referring Practitioner:  Endy Coon MD  Specific Practitioner Orders: Splint seymour, flexor tendon protocol, modalities prn     Assessment of current deficits   []? Functional mobility             [x]? ADLs          [x]? Strength                  []? Cognition   []? Functional transfers           [x]? IADLs         []? Safety Awareness  [x]? Endurance   [x]? Fine Motor Coordination    []? Balance      []? Vision/perception   [x]? Sensation     []? Gross Motor Coordination [x]? ROM           [x]? Pain                        [x]? Edema          [x]? Scar Adhesion/Skin Integrity      OT PLAN OF CARE   OT POC based on physician orders, patient diagnosis and results of clinical assessment     Frequency/Duration 2-3x a week for 18 visits  Specific OT Treatment to include:      [x]? Instruction in HEP                   Modalities:  [x]?  Therapeutic Exercise                 [x]? Ultrasound               []? Electrical Stimulation/Attended  [x]? PROM/Stretching                    [x]? Fluidotherapy          [x]?   Paraffin                   [x]? AAROM  [x]?  AROM                 []? Iontophoresis:   [x]? Bella Erm                                    []? Neuromuscular Re-Ed            []? ADL/IADL re-training    [x]?  Therapeutic Activity                  [x]? Pain Management with/without modalities PRN                 [x]?  Manual Therapy                      [x]? Splinting                                   [x]? Scar Management                   []?Joint Protection/Training  []? Ergonomics                             [x]?  Joint Mobilization                      []? Adaptive Equipment Assessment/Training                             [x]?  Manual Edema Mobilization   []? Myofascial Release                 []? Energy Conservation/Work Simplification  [x]? GM/FM Coordination                []? Safety retraining/education per  individual diagnosis/goals  []? Desensitization        Patient Specific Goal: to regain function of his R hand                             GOALS (Long term same as Short term):  1) Patient will demonstrate good understanding of home program (exercises/activities/diagnosis/prognosis/goals) with good accuracy. 2) Patient will demonstrate increased active/passive range of motion of their R thumb to [facility] for ADL/IADL completion. 3) Patient will demonstrate increased /pinch strength of at least 10 / 5 pinch pounds of their R hand. 4) Patient to report decreased pain in their affected R upper extremity from 8/10 to 2/10 or less with resistive functional use. 5) Increase in fine motor function as evidenced by decreased time to complete 9-hole peg test and/or MRMT test by at least 5-10 seconds. 6) Patient to report 100% compliance with their splint wear, care, and precautions if needed. 7) Patient will be knowledgeable of edema control techniques as evident with decreases from severe to mild/none. 8) Patient will demonstrate a non-tender/non-adherent scar. 9) Patient will report ADL functions as Mod I/I using RUE.    10) Patient will decrease QuickDASH score to 20% or less for increased participation in daily functional activities. Pain Level: ranges from 3- 5/'10. During exercises 4-5/10. Subjective: \"I wasn't doing those exercises right I see. I think i'll do them better now. \"     Objective:  Updated POC to be completed by 7/10/2022. INTERVENTION: COMPLETED: SPECIFICS/COMMENTS:   Modality:               AROM:     R thumb  X    X    x - IP flexion PROM with active ext with in splint  - MP flexion PROM with active ext with in splint  - composite thumb flexion into palm Passive then active ext. R fingers X  - flexion and ext - 5 rep's . WNL's doing at home. AAROM:                    PROM/Stretching:     R thumb  X  x - IP flexion - before doing activily   -MP flexion         Scar Mass/Edema Control:     Retrograde massage X  x - R hand and thumb, not stitch area. - mesh finger sleeve on thumb. Scar  x - not yet - may get some stiches out tomorrow. Strengthening:               Other:     Wound care x - dressing removed . sterial salt water soak then aired out. Redressed and supplies given for home use. DBS  x - readjusted to ^ fit in the thumb area. Continue to wear 24/7. HEP  x All reviewed - see above. Opt had better understanding of by the end of session. Assessment/Comments: Pt is making Good progress toward stated plan of care. Pt's edema in his thumb has decreased slightly. Therapist reveiwed all his home exercises and he feels more comfortable that he knows how to do them now. Pt does have stiffness in his thumb IP joints and this is worse 2* to his swelling. Continue with mesh finger sleeve. Will continue to progress per protocol and as pt tolerates.      -Rehab Potential: Good  -Requires OT Follow Up: Yes  Time In: 2:10 p            Time Out:   3:00 p              Visit #: 2    Treatment Charges: Mins Units   Modalities     Ther Exercise 35 2   Manual Therapy 15 1   Thera Activities     ADL/Home Mgt      Neuro Re-education     Gait Training     Group Therapy     Non-Billable Service Time     Other     Total Time/Units 50  3       -Response to Treatment: Pt feels more comfortable doing his home exercises now - he states he didn't think he was doing then correctly. Goals: Goals for pt can be see on initial eval occurring on 6/10/22    Plan:   [x]  Continues Plan of care: WIth program to decrease edema, ^ joint mobility and tendon glide and decrease pain. Treatment covered based on POC and graduated to patient's progress. Pt education continues at each visit to obtain maximum benefits from skilled OT intervention.   []  Alter Plan of care:   []  Discharge:      Luciano Zaragoza, OT /L, CHT  #[id number]

## 2022-06-16 ENCOUNTER — TREATMENT (OUTPATIENT)
Dept: OCCUPATIONAL THERAPY | Age: 32
End: 2022-06-16
Payer: COMMERCIAL

## 2022-06-16 DIAGNOSIS — S65.311A LACERATION OF DEEP PALMAR ARCH OF RIGHT HAND, INITIAL ENCOUNTER: Primary | ICD-10-CM

## 2022-06-16 PROCEDURE — 97110 THERAPEUTIC EXERCISES: CPT | Performed by: OCCUPATIONAL THERAPIST

## 2022-06-16 PROCEDURE — 97140 MANUAL THERAPY 1/> REGIONS: CPT | Performed by: OCCUPATIONAL THERAPIST

## 2022-06-16 NOTE — PROGRESS NOTES
OCCUPATIONAL THERAPY PROGRESS NOTE    Date:  2022  Initial Evaluation Date: 6/10/2022    Patient Name:  Beena Mistry    :  1990      Restrictions/Precautions:  Follow [employer] tendon repair protocol. Low fall risk  Diagnosis:  Laceration of deep palmar arch of R hand, sx with multiple repairs including [employer], R ulnar and radial digital nerves, adductor muscle - for all see below.   S65.311A (ICD-10-CM) - Laceration of deep palmar arch of right hand, initial encounter                                                       Insurance/Certification information:  [employer] (#[id number])   Date of Surgery/Injury:   Sx 2022    Plan of care signed (Y/N):  N  Visit# / total visits: Eval+   (dated 2022 to 2022- 72 units each)     Referring Practitioner:  Yuriy Buenrostro MD  Specific Practitioner Orders: Splint seymour, flexor tendon protocol, modalities prn     Assessment of current deficits   []? Functional mobility             [x]? ADLs          [x]? Strength                  []? Cognition   []? Functional transfers           [x]? IADLs         []? Safety Awareness  [x]? Endurance   [x]? Fine Motor Coordination    []? Balance      []? Vision/perception   [x]? Sensation     []? Gross Motor Coordination [x]? ROM           [x]? Pain                        [x]? Edema          [x]? Scar Adhesion/Skin Integrity      OT PLAN OF CARE   OT POC based on physician orders, patient diagnosis and results of clinical assessment     Frequency/Duration 2-3x a week for 18 visits  Specific OT Treatment to include:      [x]? Instruction in HEP                   Modalities:  [x]?  Therapeutic Exercise                 [x]? Ultrasound               []? Electrical Stimulation/Attended  [x]? PROM/Stretching                    [x]? Fluidotherapy          [x]?   Paraffin                   [x]? AAROM  [x]?  AROM                 []? Iontophoresis:   [x]? Pricila Lynn                                    []? Neuromuscular Re-Ed            []? ADL/IADL re-training    [x]?  Therapeutic Activity                  [x]? Pain Management with/without modalities PRN                 [x]?  Manual Therapy                      [x]? Splinting                                   [x]? Scar Management                   []?Joint Protection/Training  []? Ergonomics                             [x]?  Joint Mobilization                      []? Adaptive Equipment Assessment/Training                             [x]?  Manual Edema Mobilization   []? Myofascial Release                 []? Energy Conservation/Work Simplification  [x]? GM/FM Coordination                []? Safety retraining/education per  individual diagnosis/goals  []? Desensitization        Patient Specific Goal: to regain function of his R hand                             GOALS (Long term same as Short term):  1) Patient will demonstrate good understanding of home program (exercises/activities/diagnosis/prognosis/goals) with good accuracy. 2) Patient will demonstrate increased active/passive range of motion of their R thumb to [employer] for ADL/IADL completion. 3) Patient will demonstrate increased /pinch strength of at least 10 / 5 pinch pounds of their R hand. 4) Patient to report decreased pain in their affected R upper extremity from 8/10 to 2/10 or less with resistive functional use. 5) Increase in fine motor function as evidenced by decreased time to complete 9-hole peg test and/or MRMT test by at least 5-10 seconds. 6) Patient to report 100% compliance with their splint wear, care, and precautions if needed. 7) Patient will be knowledgeable of edema control techniques as evident with decreases from severe to mild/none. 8) Patient will demonstrate a non-tender/non-adherent scar. 9) Patient will report ADL functions as Mod I/I using RUE.    10) Patient will decrease QuickDASH score to 20% or less for increased participation in daily functional activities. Pain Level: ranges from 3- 5/10/ sore, tight/ pulling discomfort    Subjective: Pt presents with no new complaints. Objective:  Updated POC to be completed by 7/10/2022. INTERVENTION: COMPLETED: SPECIFICS/COMMENTS:   Modality:               AROM:     R thumb  X    X    x - IP flexion PROM with active ext with in splint  - MP flexion PROM with active ext with in splint  - composite thumb flexion into palm Passive then active ext. R fingers X  - flexion and ext - 5 rep's . WNL's doing at home. AAROM:                    PROM/Stretching:     R thumb  X  x - IP flexion - before doing activily   -MP flexion         Scar Mass/Edema Control:     Retrograde massage X  x - R hand and thumb, not stitch area. - mesh finger sleeve on thumb. Scar   - not yet - may get some stiches out tomorrow. Strengthening:               Other:     Wound care x - dressing removed . sterile salt water soak then aired out. Redressed and supplies given for home use. DBS   - readjusted to ^ fit in the thumb area. Continue to wear 24/7. HEP  x All reviewed - see above. Opt had better understanding of by the end of session. Assessment/Comments: Pt is making Good progress toward stated plan of care. Pt arrived for tx 30 min late. Edema control, ROM and wound care completed with good tolerance. The thumb was stiff at Tx start but improved as session progressed. Wounds between the fingers is much better. Swelling in the thumb is improving. Some of the sutures have been removed. Pt does have to go back to have the remaining ones removed. Will continue per protocol.     -Rehab Potential: Good  -Requires OT Follow Up: Yes  Time In:   1130        Time Out:   1200              Visit #: Eval +2    Approved Codes:   Thru 9-9-22    CODE         Mins          Units Total Used/Total Authorized  [id number] Manual 10 1 2/72   05250 Therapeutic Ex 15 1 3/72   63445 Therapeutic Activity 5 0 072 14976 Fluidotherapy   0/72   05426 Paraffin   0/72   11918    0/ 72   [id number] Pending Ortho fit      Total           -Response to Treatment: Pt is doing better with his HEP and demonstrates understanding. Goals: Goals for pt can be see on initial eval occurring on 6/10/22    Plan:   [x]  Continues Plan of care: WIth program to decrease edema, ^ joint mobility and tendon glide and decrease pain. Treatment covered based on POC and graduated to patient's progress. Pt education continues at each visit to obtain maximum benefits from skilled OT intervention.   []  Alter Plan of care:   []  Discharge:      Alexia Abraham OT /L, [id number]

## 2022-06-17 ENCOUNTER — OFFICE VISIT (OUTPATIENT)
Dept: ORTHOPEDIC SURGERY | Age: 32
End: 2022-06-17

## 2022-06-17 VITALS — HEIGHT: 67 IN | RESPIRATION RATE: 20 BRPM | BODY MASS INDEX: 34.53 KG/M2 | WEIGHT: 220 LBS

## 2022-06-17 DIAGNOSIS — S65.311A LACERATION OF DEEP PALMAR ARCH OF RIGHT HAND, INITIAL ENCOUNTER: Primary | ICD-10-CM

## 2022-06-17 PROCEDURE — 99024 POSTOP FOLLOW-UP VISIT: CPT | Performed by: PHYSICIAN ASSISTANT

## 2022-06-17 RX ORDER — BACITRACIN ZINC AND POLYMYXIN B SULFATE 500; 1000 [USP'U]/G; [USP'U]/G
OINTMENT TOPICAL
Qty: 15 G | Refills: 1 | Status: SHIPPED | OUTPATIENT
Start: 2022-06-17 | End: 2022-06-24

## 2022-06-17 RX ORDER — SULFAMETHOXAZOLE AND TRIMETHOPRIM 800; 160 MG/1; MG/1
1 TABLET ORAL 2 TIMES DAILY
Qty: 20 TABLET | Refills: 0 | Status: SHIPPED | OUTPATIENT
Start: 2022-06-17 | End: 2022-06-27

## 2022-06-17 NOTE — LETTER
[address] [facility].  [address] [address]  Phone: [phone]  Fax: [address] [place], [place]        June 17, 2022     Patient: Margarita Barnes   YOB: 1990   Date of Visit: 6/17/2022       To Whom It May Concern: It is my medical opinion that Margarita Barnes should remain out of work until 07/11/2022. If you have any questions or concerns, please don't hesitate to call.     Sincerely,        MARSHA Larsen

## 2022-06-17 NOTE — PROGRESS NOTES
HPI: Patient presents today POD #16 of the below surgery. He was here earlier in the week for suture removal with the nurse. His incision started to open up. He presents here for a wound check. He reports only bloody drainage from his incision. 1.  Excisional debridement of right thumb including skin, subcutaneous  tissues, and deep fascia. 2.  Right thumb complex wound closure measuring approximately 17 cm in  length. 3.  Right thumb microvascular revascularization of ulnar digital artery  with use of intraoperative microscope. 4.  Right radial digital nerve reconstruction using an AxoGen 2 to 3 mm  diameter allograft nerve bridging a gap of 15 mm with use of a 2 mm  conduit-assisted repair with use of intraoperative microscope. 5.  Right ulnar digital nerve reconstruction of a 15 mm AxoGen nerve  allograft repaired with 3 mm conduit-assisted repair with use of  intraoperative microscope. 6.  Repair of right thenar muscle involving the abductor pollicis  brevis. 7.  Repair of right thumb adductor muscle. 8.  Right thumb flexor pollicis longus tendon repair, 12-strand core  FiberWire suture repair achieved with double locking cruciate  anastomosis. 9.  Removal of foreign bodies within the zone of injury of the  laceration. Physical Exam: Incision around the thumb with steristrips between the webbed space. These were removed. Maceration of the incision. Scant drainage over this portion of the incision. Not enough to culture. Remaining incision over the volar portion of the thumb intact with small area of secondary healing at the base of the thumb. No drainage. No fluctuance. Thumb is held in flexion. With gentle place and hold the patient is able to maintain flexion. Patient does have subjective sensation to the radial and ulnar digital nerve which is diminished but intact. No erythema or signs of infection. Brisk capillary refill.   Otherwise her vascular intact    Assessment: POD #16    Plan: Remaining sutures removed today in the office. Wound care explained to the patient. Patient to keep incision covered. Patient was prescribed Bactrim prophylactically. Follow-up next week for wound check. All questions and concerns answered.

## 2022-06-21 ENCOUNTER — TREATMENT (OUTPATIENT)
Dept: OCCUPATIONAL THERAPY | Age: 32
End: 2022-06-21
Payer: COMMERCIAL

## 2022-06-21 DIAGNOSIS — S65.311A LACERATION OF DEEP PALMAR ARCH OF RIGHT HAND, INITIAL ENCOUNTER: Primary | ICD-10-CM

## 2022-06-21 PROCEDURE — 97110 THERAPEUTIC EXERCISES: CPT | Performed by: OCCUPATIONAL THERAPIST

## 2022-06-21 PROCEDURE — 97140 MANUAL THERAPY 1/> REGIONS: CPT | Performed by: OCCUPATIONAL THERAPIST

## 2022-06-21 PROCEDURE — 97530 THERAPEUTIC ACTIVITIES: CPT | Performed by: OCCUPATIONAL THERAPIST

## 2022-06-21 NOTE — PROGRESS NOTES
OCCUPATIONAL THERAPY PROGRESS NOTE    Date:  2022  Initial Evaluation Date: 6/10/2022    Patient Name:  Pau Sena    :  1990      Restrictions/Precautions:  Follow [employer] tendon repair protocol. Low fall risk  Diagnosis:  Laceration of deep palmar arch of R hand, sx with multiple repairs including [employer], R ulnar and radial digital nerves, adductor muscle - for all see below.   S65.311A (ICD-10-CM) - Laceration of deep palmar arch of right hand, initial encounter                                                       Insurance/Certification information:  [employer] (#[id number])   Date of Surgery/Injury:   Sx 2022    Plan of care signed (Y/N):  Y (thru 22)  Visit# / total visits: Eval+3 / 18   (dated 2022 to 2022- 72 units each)     Referring Practitioner:  Caio Gan MD  Specific Practitioner Orders: Splint seymour, flexor tendon protocol, modalities prn     Assessment of current deficits   []? Functional mobility             [x]? ADLs          [x]? Strength                  []? Cognition   []? Functional transfers           [x]? IADLs         []? Safety Awareness  [x]? Endurance   [x]? Fine Motor Coordination    []? Balance      []? Vision/perception   [x]? Sensation     []? Gross Motor Coordination [x]? ROM           [x]? Pain                        [x]? Edema          [x]? Scar Adhesion/Skin Integrity      OT PLAN OF CARE   OT POC based on physician orders, patient diagnosis and results of clinical assessment     Frequency/Duration 2-3x a week for 18 visits  Specific OT Treatment to include:      [x]? Instruction in HEP                   Modalities:  [x]?  Therapeutic Exercise                 [x]? Ultrasound               []? Electrical Stimulation/Attended  [x]? PROM/Stretching                    [x]? Fluidotherapy          [x]?   Paraffin                   [x]? AAROM  [x]?  AROM                 []? Iontophoresis:   [x]? Yoselin Isabel                                    []? Neuromuscular Re-Ed            []? ADL/IADL re-training    [x]?  Therapeutic Activity                  [x]? Pain Management with/without modalities PRN                 [x]?  Manual Therapy                      [x]? Splinting                                   [x]? Scar Management                   []?Joint Protection/Training  []? Ergonomics                             [x]?  Joint Mobilization                      []? Adaptive Equipment Assessment/Training                             [x]?  Manual Edema Mobilization   []? Myofascial Release                 []? Energy Conservation/Work Simplification  [x]? GM/FM Coordination                []? Safety retraining/education per  individual diagnosis/goals  []? Desensitization        Patient Specific Goal: to regain function of his R hand                             GOALS (Long term same as Short term):  1) Patient will demonstrate good understanding of home program (exercises/activities/diagnosis/prognosis/goals) with good accuracy. 2) Patient will demonstrate increased active/passive range of motion of their R thumb to [facility] for ADL/IADL completion. 3) Patient will demonstrate increased /pinch strength of at least 10 / 5 pinch pounds of their R hand. 4) Patient to report decreased pain in their affected R upper extremity from 8/10 to 2/10 or less with resistive functional use. 5) Increase in fine motor function as evidenced by decreased time to complete 9-hole peg test and/or MRMT test by at least 5-10 seconds. 6) Patient to report 100% compliance with their splint wear, care, and precautions if needed. 7) Patient will be knowledgeable of edema control techniques as evident with decreases from severe to mild/none. 8) Patient will demonstrate a non-tender/non-adherent scar. 9) Patient will report ADL functions as Mod I/I using RUE.    10) Patient will decrease QuickDASH score to 20% or less for increased participation in daily functional activities. Pain Level: ranges from 2-3/10 with ROM only/ pulling sensation  Subjective: \" My hand has been swelling more and it is stiffer. \"   Objective:  Updated POC to be completed by 7/10/2022. INTERVENTION: COMPLETED: SPECIFICS/COMMENTS:   Modality:               AROM:     R thumb  X    X    x - IP flexion PROM with active ext with in splint  - MP flexion PROM with active ext with in splint  - composite thumb flexion into palm Passive then active protective  ext. R fingers X     X  x - flexion and ext - 5 rep's . WNL's doing at home. - Fingertip curls 20x  - isolated MCP flex/ ext 20x   R wrist ROM x - modified tenodesis (relaxed wrist flexion, assitve wrist ext to neutral with passive thumb into flexion/ 10x   AAROM:                    PROM/Stretching:     R thumb  X  X  x - IP flexion - before doing activily   -MP flexion   - composite flexion        Scar Mass/Edema Control:     Retrograde massage X    x - R hand and thumb, incisional area avoided  - mesh finger sleeve on thumb. Scar   - not yet - may get some stiches out tomorrow. Strengthening:               Other:     Wound care x - dressing removed . sterile salt water soak then aired out. Redressed and supplies given for home use. ( sutures are all removed- mild dehisence is present in the volar thumb/ web space - mild bloody seepage only   DBS   - readjusted to ^ fit in the thumb area. Continue to wear 24/7. HEP  x All reviewed - see above. Opt had better understanding of by the end of session. Assessment/Comments: Pt is making Good progress toward stated plan of care. Pt presents today with all sutures removed. Pt has been ordered Bactrim but he states he hasn't picked it up yet. Wound check showed mild bleeding in the web space. No signs of redness is present. Passive thumb flexion with protective extension continued. The thumb was very stiff at Tx start but improved as session progressed.  Modified tenodesis added for wrist ROM and is tolerated well. Swelling in the hand decreased nicely during session. Will continue per protocol.     -Rehab Potential: Good  -Requires OT Follow Up: Yes  Time In:   1100        Time Out:   1200              Visit #: Eval +3    Approved Codes: Thru 9-9-22    CODE         Mins          Units Total Used/Total Authorized  [id number] Manual 30 2 4/72   79193 Therapeutic Ex 15 1 4/72   46758 Therapeutic Activity 15 1 172   68943 Fluidotherapy   0/72   85456 [facility]   0/72   63467 US   0/ 72   76732 Pending Ortho fit      Total           -Response to Treatment: Pt is doing better with his HEP and demonstrates understanding. He is pleased with his status following today's session. Goals: Goals for pt can be see on initial eval occurring on 6/10/22    Plan:   [x]  Continues Plan of care: WIth program to decrease edema, ^ joint mobility and tendon glide and decrease pain. Treatment covered based on POC and graduated to patient's progress. Pt education continues at each visit to obtain maximum benefits from skilled OT intervention.   []  Alter Plan of care:   []  Discharge:      Alexia Abraham OT /ALBERTO, [id number]

## 2022-06-28 ENCOUNTER — TREATMENT (OUTPATIENT)
Dept: OCCUPATIONAL THERAPY | Age: 32
End: 2022-06-28
Payer: COMMERCIAL

## 2022-06-28 DIAGNOSIS — S65.311A LACERATION OF DEEP PALMAR ARCH OF RIGHT HAND, INITIAL ENCOUNTER: Primary | ICD-10-CM

## 2022-06-28 PROCEDURE — 97110 THERAPEUTIC EXERCISES: CPT | Performed by: OCCUPATIONAL THERAPIST

## 2022-06-28 PROCEDURE — 97140 MANUAL THERAPY 1/> REGIONS: CPT | Performed by: OCCUPATIONAL THERAPIST

## 2022-06-28 PROCEDURE — 97530 THERAPEUTIC ACTIVITIES: CPT | Performed by: OCCUPATIONAL THERAPIST

## 2022-06-28 NOTE — PROGRESS NOTES
OCCUPATIONAL THERAPY PROGRESS NOTE    Date:  2022  Initial Evaluation Date: 6/10/2022    Patient Name:  Kirstin Garcia    :  1990      Restrictions/Precautions:  Follow F[employer] tendon repair protocol. Low fall risk  Diagnosis:  Laceration of deep palmar arch of R hand, sx with multiple repairs including FPL, R ulnar and radial digital nerves, adductor muscle - for all see below.   S65.311A (ICD-10-CM) - Laceration of deep palmar arch of right hand, initial encounter                                                       Insurance/Certification information:  [employer] (#[id number])   Date of Surgery/Injury:   Sx 2022    Plan of care signed (Y/N):  Y (thru 22)  Visit# / total visits: Eval+   (dated 2022 to 2022- 72 units each)     Referring Practitioner:  Yesika Riley MD  Specific Practitioner Orders: Splint seymour, flexor tendon protocol, modalities prn     Assessment of current deficits   []? Functional mobility             [x]? ADLs          [x]? Strength                  []? Cognition   []? Functional transfers           [x]? IADLs         []? Safety Awareness  [x]? Endurance   [x]? Fine Motor Coordination    []? Balance      []? Vision/perception   [x]? Sensation     []? Gross Motor Coordination [x]? ROM           [x]? Pain                        [x]? Edema          [x]? Scar Adhesion/Skin Integrity      OT PLAN OF CARE   OT POC based on physician orders, patient diagnosis and results of clinical assessment     Frequency/Duration 2-3x a week for 18 visits  Specific OT Treatment to include:      [x]? Instruction in HEP                   Modalities:  [x]?  Therapeutic Exercise                 [x]? Ultrasound               []? Electrical Stimulation/Attended  [x]? PROM/Stretching                    [x]? Fluidotherapy          [x]?   Paraffin                   [x]? AAROM  [x]?  AROM                 []? Iontophoresis:   [x]? Lupe French                                    []? Neuromuscular Re-Ed            []? ADL/IADL re-training    [x]?  Therapeutic Activity                  [x]? Pain Management with/without modalities PRN                 [x]?  Manual Therapy                      [x]? Splinting                                   [x]? Scar Management                   []?Joint Protection/Training  []? Ergonomics                             [x]?  Joint Mobilization                      []? Adaptive Equipment Assessment/Training                             [x]?  Manual Edema Mobilization   []? Myofascial Release                 []? Energy Conservation/Work Simplification  [x]? GM/FM Coordination                []? Safety retraining/education per  individual diagnosis/goals  []? Desensitization        Patient Specific Goal: to regain function of his R hand                             GOALS (Long term same as Short term):  1) Patient will demonstrate good understanding of home program (exercises/activities/diagnosis/prognosis/goals) with good accuracy. 2) Patient will demonstrate increased active/passive range of motion of their R thumb to [facility] for ADL/IADL completion. 3) Patient will demonstrate increased /pinch strength of at least 10 / 5 pinch pounds of their R hand. 4) Patient to report decreased pain in their affected R upper extremity from 8/10 to 2/10 or less with resistive functional use. 5) Increase in fine motor function as evidenced by decreased time to complete 9-hole peg test and/or MRMT test by at least 5-10 seconds. 6) Patient to report 100% compliance with their splint wear, care, and precautions if needed. 7) Patient will be knowledgeable of edema control techniques as evident with decreases from severe to mild/none. 8) Patient will demonstrate a non-tender/non-adherent scar. 9) Patient will report ADL functions as Mod I/I using RUE.    10) Patient will decrease QuickDASH score to 20% or less for increased participation in daily functional activities. Pain Level: ranges from 2-3/10 with ROM only/ pulling sensation  Subjective: Pt states he has missed therapy due to some personal issues. Objective:  Updated POC to be completed by 7/10/2022. INTERVENTION: COMPLETED: SPECIFICS/COMMENTS:   Modality:               AROM:     R thumb  X    X    x - IP flexion PROM with active ext with in splint  - MP flexion PROM with active ext with in splint  - composite thumb flexion into palm Passive then active protective  ext. R fingers X     X  x - flexion and ext - 5 rep's . WNL's doing at home. - Fingertip curls 20x  - isolated MCP flex/ ext 20x   R wrist ROM x - modified tenodesis (relaxed wrist flexion, assitve wrist ext to neutral with passive thumb into flexion/ 10x   AAROM:     R thumb AAROM X  x - place and hold thumb flexion (brief)  - gentle thumb ABD/ ADD             PROM/Stretching:     R thumb  X  X  x - IP flexion - before doing activily   -MP flexion   - composite flexion        Scar Mass/Edema Control:     Retrograde massage X    x - R hand and thumb, incisional area avoided  - mesh finger sleeve on thumb. Scar      x - not yet due to incision still healing at points  - skin debridement completed of all sloughing skin/ tolerated well   Strengthening:               Other:     Wound care x - mild dehisence is present in the volar thumb/ web space - no seepage    DBS   - readjusted to ^ fit in the thumb area. Continue to wear 24/7. HEP  x All reviewed - see above. Opt had better understanding of by the end of session. Assessment/Comments: Pt is making Good progress toward stated plan of care. Pt presented today without his DBS. He reports leaving it at home after he was cleansing his hand. Pt was reminded he should have it on at all times when in the community or hygiene/ HEP. When questioned about home exercises, pt was very vague on whether he has been compliant.      ROM continued with the addition of place and hold thumb flexion. Activation of the thumb flexors is noted. Will continue per protocol.     -Rehab Potential: Good  -Requires OT Follow Up: Yes  Time In:   1110        Time Out:   7900              Visit #: Eval +3    Approved Codes: Thru 9-9-22    CODE         Mins          Units Total Used/Total Authorized  [id number] Manual 30 2 6/72   99895 Therapeutic Ex 15 1 5/72   27576 Therapeutic Activity 15 1 2/72   10465 Fluidotherapy   0/72   00431 Paraffin   0/72   30942 US   0/ 72   26781 Pending Ortho fit      Total           -Response to Treatment: Pt is happy with the healing in the hand. Pt questioned whether [place]. This was strongly discouraged and therapist explained the high risk of further infection. Goals: Goals for pt can be see on initial eval occurring on 6/10/22    Plan:   [x]  Continues Plan of care: WIth program to decrease edema, ^ joint mobility and tendon glide and decrease pain. Advance into AROM at 4 weeks post op. Treatment covered based on POC and graduated to patient's progress. Pt education continues at each visit to obtain maximum benefits from skilled OT intervention.   []  Alter Plan of care:   []  Discharge:      Elizabeth Stewart, OT /L, [id number]

## 2022-06-30 ENCOUNTER — OFFICE VISIT (OUTPATIENT)
Dept: ORTHOPEDIC SURGERY | Age: 32
End: 2022-06-30

## 2022-06-30 VITALS — RESPIRATION RATE: 22 BRPM | WEIGHT: 220 LBS | BODY MASS INDEX: 34.53 KG/M2 | HEIGHT: 67 IN

## 2022-06-30 DIAGNOSIS — S65.311A LACERATION OF DEEP PALMAR ARCH OF RIGHT HAND, INITIAL ENCOUNTER: Primary | ICD-10-CM

## 2022-06-30 PROCEDURE — 99024 POSTOP FOLLOW-UP VISIT: CPT | Performed by: ORTHOPAEDIC SURGERY

## 2022-06-30 RX ORDER — BACITRACIN ZINC AND POLYMYXIN B SULFATE 500; 1000 [USP'U]/G; [USP'U]/G
OINTMENT TOPICAL
Qty: 30 G | Refills: 0 | Status: SHIPPED | OUTPATIENT
Start: 2022-06-30 | End: 2022-07-07

## 2022-06-30 RX ORDER — BACITRACIN ZINC AND POLYMYXIN B SULFATE 500; 1000 [USP'U]/G; [USP'U]/G
OINTMENT TOPICAL
Qty: 30 G | Refills: 0 | Status: SHIPPED | OUTPATIENT
Start: 2022-06-30 | End: 2022-06-30 | Stop reason: SDUPTHER

## 2022-06-30 NOTE — PROGRESS NOTES
3 weeks postop doing well. He is pleased with his progress. He seen in the office today without his splint on. He was cautioned to wear his splint as he is at increased risk of rerupture. 1.  Excisional debridement of right thumb including skin, subcutaneous  tissues, and deep fascia. 2.  Right thumb complex wound closure measuring approximately 17 cm in  length. 3.  Right thumb microvascular revascularization of ulnar digital artery  with use of intraoperative microscope. 4.  Right radial digital nerve reconstruction using an AxoGen 2 to 3 mm  diameter allograft nerve bridging a gap of 15 mm with use of a 2 mm  conduit-assisted repair with use of intraoperative microscope. 5.  Right ulnar digital nerve reconstruction of a 15 mm AxoGen nerve  allograft repaired with 3 mm conduit-assisted repair with use of  intraoperative microscope. 6.  Repair of right thenar muscle involving the abductor pollicis  brevis. 7.  Repair of right thumb adductor muscle. 8.  Right thumb flexor pollicis longus tendon repair, 12-strand core  FiberWire suture repair achieved with double locking cruciate  anastomosis. 9.  Removal of foreign bodies within the zone of injury of the  laceration.         Physical exam: Thumb wound healing. There is some necrotic tissue that was debrided today in the office over the MCP joint flexion crease. Deep dermal tissue remained intact without any exposed tendon or neurovascular structures. He remains with brisk cap refill of the thumb. He reports improving sensation in the pulp of the thumb. He does have intact pull-through of the FPL. Assessment: 3 weeks out right thumb    Plan repair today's findings were explained. Did caution the patient to continue using his brace to protect his flexor tendon repair. May wean out of the brace at 1 month postop. Strengthening at 8 weeks. I did relate this to the patient and explained this to him in detail.

## 2022-07-01 ENCOUNTER — TELEPHONE (OUTPATIENT)
Dept: OCCUPATIONAL THERAPY | Age: 32
End: 2022-07-01

## 2022-07-06 ENCOUNTER — TELEPHONE (OUTPATIENT)
Dept: OCCUPATIONAL THERAPY | Age: 32
End: 2022-07-06

## 2022-07-06 NOTE — TELEPHONE ENCOUNTER
Pt did not show for today's OT appointment. Therapist called the pt and left a message for him to call the office regarding his appointments.

## 2022-07-08 ENCOUNTER — TREATMENT (OUTPATIENT)
Dept: OCCUPATIONAL THERAPY | Age: 32
End: 2022-07-08
Payer: COMMERCIAL

## 2022-07-08 DIAGNOSIS — S65.311A LACERATION OF DEEP PALMAR ARCH OF RIGHT HAND, INITIAL ENCOUNTER: Primary | ICD-10-CM

## 2022-07-08 PROCEDURE — 97110 THERAPEUTIC EXERCISES: CPT | Performed by: OCCUPATIONAL THERAPIST

## 2022-07-08 PROCEDURE — 97140 MANUAL THERAPY 1/> REGIONS: CPT | Performed by: OCCUPATIONAL THERAPIST

## 2022-07-08 PROCEDURE — 97530 THERAPEUTIC ACTIVITIES: CPT | Performed by: OCCUPATIONAL THERAPIST

## 2022-07-08 NOTE — PROGRESS NOTES
[]? Neuromuscular Re-Ed            []? ADL/IADL re-training    [x]?  Therapeutic Activity                  [x]? Pain Management with/without modalities PRN                 [x]?  Manual Therapy                      [x]? Splinting                                   [x]? Scar Management                   []?Joint Protection/Training  []? Ergonomics                             [x]?  Joint Mobilization                      []? Adaptive Equipment Assessment/Training                             [x]?  Manual Edema Mobilization   []? Myofascial Release                 []? Energy Conservation/Work Simplification  [x]? GM/FM Coordination                []? Safety retraining/education per  individual diagnosis/goals  []? Desensitization        Patient Specific Goal: to regain function of his R hand                             GOALS (Long term same as Short term): updated 7-8-22  1) Patient will demonstrate good understanding of home program (exercises/activities/diagnosis/prognosis/goals) with good accuracy. (Progress noted for edema control, wound care, protective ROM, splint wear/ care)  2) Patient will demonstrate increased active/passive range of motion of their R thumb to [facility] for ADL/IADL completion. (Progress noted- see measurements on 7-8-22)  3) Patient will demonstrate increased /pinch strength of at least 10 / 5 pinch pounds of their R hand. (TBA at 8 + weeks post op per protocol)  4) Patient to report decreased pain in their affected R upper extremity from 8/10 to 2/10 or less with resistive functional use. (Progress noted- pain averages 2-3/10 with ROM exercises)  5) Increase in fine motor function as evidenced by decreased time to complete 9-hole peg test and/or MRMT test by at least 5-10 seconds. (Pt is unable to complete 9 Hole testing due to use of the thumb as a gross stabilizer only.  Pt is able to  medium sized ligt objects with the affected thumb but lacks manipulation)  6) Patient to report 100% compliance with their splint wear, care, and precautions if needed. (Progress is inconsistent- pt has recently discontinued his brace but was reminded to still use it for heavy / moderate resistive tasks to protect the repair)   7) Patient will be knowledgeable of edema control techniques as evident with decreases from severe to mild/none. (Progress noted- edema in the hand has decreased to mild except for moderate swelling around the thumb)  8) Patient will demonstrate a non-tender/non-adherent scar. (Progress noted- stitches have been removed and MD recently removed necrotic tissue from the thumb. Wound healing continues in the volar thumb. Remaining scars are tender and very tight in the surrounding tissues. )  9) Patient will report ADL functions as Mod I/I using RUE. ( Progress noted- pt states he has recently been his hand fully since his MD appointment with fair success / limited thumb use. Pt was reminded to use the hand for light tasks only and to avoid heavy resistance per protocol. Pt verbalized understanding.)  10) Patient will decrease QuickDASH score to 20% or less for increased participation in daily functional activities. (TBA)      Pain Level: ranges from 2-3/10 with ROM only/ pulling sensation  Subjective: Pt states he has not been doing his exercises and has attending only 5 OT visits including today. Objective:  Updated POC to be completed by 7/10/2022. INTERVENTION: COMPLETED: SPECIFICS/COMMENTS:   Modality:               AROM:     R thumb  X    X    X    x - IP flexion PROM with active ext with in splint  - MP flexion PROM with active ext with in splint  - composite thumb flexion into palm Passive then active protective  ext. - Full active thumb AROM/ measurements are as below   R fingers X     X  x - flexion and ext - 5 rep's . WNL's doing at home.    - Fingertip curls 20x  - isolated MCP flex/ ext 20x   R wrist ROM x - modified tenodesis (relaxed wrist flexion, assitve wrist ext to neutral with passive thumb into flexion/ 10x   AAROM:     R thumb AAROM X  x - place and hold thumb flexion (brief)  -  thumb ABD/ ADD             PROM/Stretching:     R thumb  X  X  x - IP flexion - before doing activily   -MP flexion   - composite flexion        Scar Mass/Edema Control:     Retrograde massage X    x - R hand and thumb, incisional area avoided  - mesh finger sleeve on thumb. Scar      x - not yet due to incision still healing at points  - skin debridement completed of all sloughing skin/ tolerated well   Strengthening:               Other:     Wound care/ hygiene x - hand washed with anti-bacterial soap and water by therapist while avoiding the volar thumb. Excessive skin sloughed off and cleansed to encourage healthy skin recovery. Light dressing applied to the volar thumb afterwards. DBS  x - OK to take off for light hand use only and hygiene/ ROM exercise   HEP  x All reviewed - see above. Opt had better understanding of by the end of session. Assessment/Comments: Pt is making progress toward stated plan of care. Pt is 5 weeks post op today. He has missed his last 3 OT sessions and states he hasn't been doing his exercises like he should. Pt reports taking off his splint and using the hand at home. Pt reports he helped family to move and was lifting/ carrying boxes with the hand. Restrictions in hand use discussed and pt was encouraged to perform light tasks only. The importance of attending therapy and doing the home exercises was discussed.      AROM checked today as follows:       7-8-22      Thumb IP  15-25      Thumb MP  10-40      Thumb ADD/ ABD  0-44      Thumb radial extension  0-50 with tendon tightness/ IP flexion      Wrist flexion  0-54      Wrist ext  0-57      RD  0-15      UD  0-30           -Rehab Potential: Good  -Requires OT Follow Up: Yes  Time In:   1110        Time Out:   1205              Visit #: Eval +5    Approved Codes:  Thru 9-9-22    CODE         Mins          Units Total Used/Total Authorized  [id number] Manual 15 1 7/72   60223 Therapeutic Ex 10 1 6/72   82969 Therapeutic Activity 30 2 4/72   71892 Fluidotherapy   0/72   05379 Paraffin   0/72   17664 US   0/ 72   82279 Pending Ortho fit      Total           -Response to Treatment: Pt reports he will make his recovery more of a priority. Goals: Goals for pt can be see on initial eval occurring on 6/10/22    Plan:   [x]  Continues Plan of care: Continue current POC including tendon glides, AROM, and scar mgmt. Treatment covered based on POC and graduated to patient's progress. Pt education continues at each visit to obtain maximum benefits from skilled OT intervention.   []  Alter Plan of care:   []  Discharge:      Andrey Pedersen OT /ALBERTO, [id number]

## 2022-07-13 ENCOUNTER — TREATMENT (OUTPATIENT)
Dept: OCCUPATIONAL THERAPY | Age: 32
End: 2022-07-13
Payer: COMMERCIAL

## 2022-07-13 DIAGNOSIS — S65.311A LACERATION OF DEEP PALMAR ARCH OF RIGHT HAND, INITIAL ENCOUNTER: Primary | ICD-10-CM

## 2022-07-13 PROCEDURE — 97110 THERAPEUTIC EXERCISES: CPT | Performed by: OCCUPATIONAL THERAPIST

## 2022-07-13 PROCEDURE — 97140 MANUAL THERAPY 1/> REGIONS: CPT | Performed by: OCCUPATIONAL THERAPIST

## 2022-07-13 PROCEDURE — 97530 THERAPEUTIC ACTIVITIES: CPT | Performed by: OCCUPATIONAL THERAPIST

## 2022-07-13 NOTE — PROGRESS NOTES
OCCUPATIONAL THERAPY   PROGRESS NOTE    Date:  2022  Initial Evaluation Date: 6/10/2022    Patient Name:  Salvatore Kate    :  1990      Restrictions/Precautions:  Follow [employer] tendon repair protocol. Low fall risk  Diagnosis:  Laceration of deep palmar arch of R hand, sx with multiple repairs including FPL, R ulnar and radial digital nerves, adductor muscle - for all see below.   S65.311A (ICD-10-CM) - Laceration of deep palmar arch of right hand, initial encounter                                                       Insurance/Certification information:  [employer] (#[id number])   Date of Surgery/Injury:   Sx 2022    Plan of care signed (Y/N):  Y (thru 22)  Visit# / total visits: Eval+   (dated 2022 to 2022- 72 units each)     Referring Practitioner:  Nolberto Tabor MD  Specific Practitioner Orders: Splint seymour, flexor tendon protocol, modalities prn     Assessment of current deficits   []? Functional mobility             [x]? ADLs          [x]? Strength                  []? Cognition   []? Functional transfers           [x]? IADLs         []? Safety Awareness  [x]? Endurance   [x]? Fine Motor Coordination    []? Balance      []? Vision/perception   [x]? Sensation     []? Gross Motor Coordination [x]? ROM           [x]? Pain                        [x]? Edema          [x]? Scar Adhesion/Skin Integrity      OT PLAN OF CARE   OT POC based on physician orders, patient diagnosis and results of clinical assessment     Frequency/Duration 2-3x a week for 18 visits  Specific OT Treatment to include:      [x]? Instruction in HEP                   Modalities:  [x]?  Therapeutic Exercise                 [x]? Ultrasound               []? Electrical Stimulation/Attended  [x]? PROM/Stretching                    [x]? Fluidotherapy          [x]?   Paraffin                   [x]? AAROM  [x]?  AROM                 []? Iontophoresis:   [x]? Byron Chinchilla                                    []? Neuromuscular Re-Ed            []? ADL/IADL re-training    [x]?  Therapeutic Activity                  [x]? Pain Management with/without modalities PRN                 [x]?  Manual Therapy                      [x]? Splinting                                   [x]? Scar Management                   []?Joint Protection/Training  []? Ergonomics                             [x]?  Joint Mobilization                      []? Adaptive Equipment Assessment/Training                             [x]?  Manual Edema Mobilization   []? Myofascial Release                 []? Energy Conservation/Work Simplification  [x]? GM/FM Coordination                []? Safety retraining/education per  individual diagnosis/goals  []? Desensitization        Patient Specific Goal: to regain function of his R hand                             GOALS (Long term same as Short term): updated 7-8-22  1) Patient will demonstrate good understanding of home program (exercises/activities/diagnosis/prognosis/goals) with good accuracy. (Progress noted for edema control, wound care, protective ROM, splint wear/ care)  2) Patient will demonstrate increased active/passive range of motion of their R thumb to [facility] for ADL/IADL completion. (Progress noted- see measurements on 7-8-22)  3) Patient will demonstrate increased /pinch strength of at least 10 / 5 pinch pounds of their R hand. (TBA at 8 + weeks post op per protocol)  4) Patient to report decreased pain in their affected R upper extremity from 8/10 to 2/10 or less with resistive functional use. (Progress noted- pain averages 2-3/10 with ROM exercises)  5) Increase in fine motor function as evidenced by decreased time to complete 9-hole peg test and/or MRMT test by at least 5-10 seconds. (Pt is unable to complete 9 Hole testing due to use of the thumb as a gross stabilizer only.  Pt is able to  medium sized ligt objects with the affected thumb but lacks manipulation)  6) Patient to report 100% compliance with their splint wear, care, and precautions if needed. (Progress is inconsistent- pt has recently discontinued his brace but was reminded to still use it for heavy / moderate resistive tasks to protect the repair)   7) Patient will be knowledgeable of edema control techniques as evident with decreases from severe to mild/none. (Progress noted- edema in the hand has decreased to mild except for moderate swelling around the thumb)  8) Patient will demonstrate a non-tender/non-adherent scar. (Progress noted- stitches have been removed and MD recently removed necrotic tissue from the thumb. Wound healing continues in the volar thumb. Remaining scars are tender and very tight in the surrounding tissues. )  9) Patient will report ADL functions as Mod I/I using RUE. ( Progress noted- pt states he has recently been his hand fully since his MD appointment with fair success / limited thumb use. Pt was reminded to use the hand for light tasks only and to avoid heavy resistance per protocol. Pt verbalized understanding.)  10) Patient will decrease QuickDASH score to 20% or less for increased participation in daily functional activities. (TBA)      Pain Level: ranges from 2/10 to 5/10 with ROM only/ pulling sensation  Subjective: \" I have stepped it up lately but my thumb hurts more. \"   Objective:  Updated POC to be completed by 8/10/2022. INTERVENTION: COMPLETED: SPECIFICS/COMMENTS:   Modality:               AROM:     R thumb  X  x  X   - Thumb AROM/ ABD, radial extension  - Blocking ex at IP and MP  - thumb alternating oppostition with beads   R fingers  - flexion and ext - 5 rep's . WNL's doing at home.    - Fingertip curls 20x  - isolated MCP flex/ ext 20x   R wrist ROM x - modified tenodesis (relaxed wrist flexion, assitve wrist ext to neutral with passive thumb into flexion/ 10x   AAROM:     R thumb AAROM X  x - place and hold thumb flexion   -  thumb ABD/ ADD PROM/Stretching:     R thumb  X  X  X  x - IP flexion - before doing activily   -MP flexion   - composite flexion  - gentle ABD and radial extension        Scar Mass/Edema Control:     Retrograde massage X    x - R hand and thumb, incisional area avoided  - mesh finger sleeve on thumb. Scar  x   - To all closed scars/ avoiding healing areas   Strengthening:               Other:     Wound care/ hygiene x - pt has open areas in the volar thumb near the web space/ area of prior debridement is improving but remains partially scabbed   DBS  x - OK to take off for light hand use only and hygiene/ ROM exercise   HEP  x All reviewed - see above. Opt had better understanding of by the end of session. Assessment/Comments: Pt is making progress toward stated plan of care. AROM continued today with a focus on thumb ROM and on functional non-resistive thumb use. Improvements are noted with radial extension and thumb flexion. Movement at the IP remains very stiff. Pt was encouraged to continue home scar massage, thumb AROM all planes and PROM at the IP. Will continue.     -Rehab Potential: Good  -Requires OT Follow Up: Yes  Time In:   1415       Time Out:   1500           Visit #: Eval +6    Approved Codes: Thru 9-9-22    CODE         Mins          Units Total Used/Total Authorized  [id number] Manual 20 1 8/72   20092 Therapeutic Ex 10 1 7/72   15604 Therapeutic Activity 15 1 5/72   13791 Fluidotherapy   0/72   47571 Paraffin   0/72   91027 US   0/ 72   77871 Pending Ortho fit      Total           -Response to Treatment: Pt reports he will make his recovery more of a priority. Goals: Goals for pt can be see on initial eval occurring on 6/10/22, update on 7-8-22. Plan:   [x]  Continues Plan of care: Continue current POC including tendon glides, AROM, functional hand tasks and scar mgmt. Treatment covered based on POC and graduated to patient's progress.  Pt education continues at each visit to obtain maximum benefits from skilled OT intervention.   []  Alter Plan of care:   []  Discharge:      Theresa Quan OT /L, [id number]

## 2022-07-20 ENCOUNTER — TREATMENT (OUTPATIENT)
Dept: OCCUPATIONAL THERAPY | Age: 32
End: 2022-07-20
Payer: COMMERCIAL

## 2022-07-20 DIAGNOSIS — S65.311A LACERATION OF DEEP PALMAR ARCH OF RIGHT HAND, INITIAL ENCOUNTER: Primary | ICD-10-CM

## 2022-07-20 PROCEDURE — 97110 THERAPEUTIC EXERCISES: CPT | Performed by: OCCUPATIONAL THERAPIST

## 2022-07-20 PROCEDURE — 97140 MANUAL THERAPY 1/> REGIONS: CPT | Performed by: OCCUPATIONAL THERAPIST

## 2022-07-20 PROCEDURE — 97530 THERAPEUTIC ACTIVITIES: CPT | Performed by: OCCUPATIONAL THERAPIST

## 2022-07-20 NOTE — PROGRESS NOTES
[facility] [address]  Dept: [address] [place] [address] [address]: [address] OT Fax: [phone]    OCCUPATIONAL THERAPY   PROGRESS NOTE    Date:  2022  Initial Evaluation Date: 6/10/2022    Patient Name:  Macario Beckwith    :  1990      Restrictions/Precautions: Follow [employer] tendon repair protocol. Low fall risk  Diagnosis:  Laceration of deep palmar arch of R hand, sx with multiple repairs including F[employer], R ulnar and radial digital nerves, adductor muscle - for all see below.    X64.911H (ICD-10-CM) - Laceration of deep palmar arch of right hand, initial encounter                                                       Insurance/Certification information:  [employer] (#[id number])   Date of Surgery/Injury:   Sx 2022    Plan of care signed (Y/N):  Y (thru 22)  Visit# / total visits: Eval+   (dated 2022 to 2022- 72 units each)     Referring Practitioner:  Saravanan Patterson MD  Specific Practitioner Orders: Splint seymour, flexor tendon protocol, modalities prn     Assessment of current deficits   [] Functional mobility             [x] ADLs          [x] Strength                  [] Cognition   [] Functional transfers           [x] IADLs         [] Safety Awareness  [x] Endurance   [x] Fine Motor Coordination    [] Balance      [] Vision/perception   [x] Sensation     [] Gross Motor Coordination [x] ROM           [x] Pain                        [x] Edema          [x] Scar Adhesion/Skin Integrity      OT PLAN OF CARE   OT POC based on physician orders, patient diagnosis and results of clinical assessment     Frequency/Duration 2-3x a week for 18 visits  Specific OT Treatment to include:      [x] Instruction in HEP                   Modalities:  [x] Therapeutic Exercise                 [x] Ultrasound               [] Electrical Stimulation/Attended  [x] PROM/Stretching                    [x] Fluidotherapy          [x]  Paraffin [x] AAROM  [x] AROM                 [] Iontophoresis:   [x] Tendon Glides                                               [] Neuromuscular Re-Ed            [] ADL/IADL re-training    [x] Therapeutic Activity                  [x] Pain Management with/without modalities PRN                 [x] Manual Therapy                      [x] Splinting                                   [x] Scar Management                   []Joint Protection/Training  []Ergonomics                             [x] Joint Mobilization                      [] Adaptive Equipment Assessment/Training                             [x] Manual Edema Mobilization   [] Myofascial Release                 [] Energy Conservation/Work Simplification  [x] GM/FM Coordination                [] Safety retraining/education per  individual diagnosis/goals  [] Desensitization        Patient Specific Goal: to regain function of his R hand                             GOALS (Long term same as Short term): updated 7-8-22  1) Patient will demonstrate good understanding of home program (exercises/activities/diagnosis/prognosis/goals) with good accuracy. (Progress noted for edema control, wound care, protective ROM, splint wear/ care)  2) Patient will demonstrate increased active/passive range of motion of their R thumb to [facility] for ADL/IADL completion. (Progress noted- see measurements on 7-8-22)  3) Patient will demonstrate increased /pinch strength of at least 10 / 5 pinch pounds of their R hand. (TBA at 8 + weeks post op per protocol)  4) Patient to report decreased pain in their affected R upper extremity from 8/10 to 2/10 or less with resistive functional use. (Progress noted- pain averages 2-3/10 with ROM exercises)  5) Increase in fine motor function as evidenced by decreased time to complete 9-hole peg test and/or MRMT test by at least 5-10 seconds. (Pt is unable to complete 9 Hole testing due to use of the thumb as a gross stabilizer only.  Pt is able to modified tenodesis (relaxed wrist flexion, assitve wrist ext to neutral with passive thumb into flexion/ 10x   Coordination ex     FM activities X  X  x - screw manipulation  - in hand manipulation with marbles  - opening containers ( twist and flip tops)                  PROM/Stretching:     R thumb  X  X  X  x - IP flexion - before doing activily   -MP flexion   - composite flexion  - gentle ABD and radial extension        Scar Mass/Edema Control:     Retrograde massage X    x - R hand and thumb, incisional area avoided  - mesh finger sleeve on thumb. Scar  x   - To all closed scars/ avoiding healing areas in the volar thumb   Strengthening:               Other:     Wound care/ hygiene x - pt has open areas in the volar thumb near the web space/ area of prior debridement is improving but remains partially scabbed   DBS  x - OK to take off for light hand use only and hygiene/ ROM exercise   HEP  x All reviewed - see above. Opt had better understanding of by the end of session. Assessment/Comments: Pt is making progress toward stated plan of care. AROM and FM activities completed as tolerated. Stiffness noted mostly with thumb IP movement and radial extension. The volar thumb wound is scabbed over but it makes scar mgmt more limited in the area. Will continue.     -Rehab Potential: Good  -Requires OT Follow Up: Yes  Time In:   0915      Time Out:   1005           Visit #: Eval +7    Approved Codes: Thru 9-9-22    CODE         Mins          Units Total Used/Total Authorized  [id number] Manual 15 1 9/72   28188 Therapeutic Ex 15 1 8/72   77582 Therapeutic Activity 15 1 6/72   04718 Fluidotherapy   0/72   13777 Paraffin   0/72   00202 US   0/ 72   96069 Pending Ortho fit       No charge-  5     Total           -Response to Treatment: Pt states his thumb is very stiff. Difficulties continue with opening bottles. Pt is not using the R to eat but is encouraged to try.    Goals: Goals for pt can be see on initial eval occurring on 6/10/22, update on 7-8-22. Plan:   [x]  Continues Plan of care: Continue current POC including tendon glides, AROM, functional hand tasks and scar mgmt. Treatment covered based on POC and graduated to patient's progress. Pt education continues at each visit to obtain maximum benefits from skilled OT intervention.   []  Alter Plan of care:   []  Discharge:      Chet Kirby OT /L, [id number]

## 2022-07-22 ENCOUNTER — TREATMENT (OUTPATIENT)
Dept: OCCUPATIONAL THERAPY | Age: 32
End: 2022-07-22
Payer: COMMERCIAL

## 2022-07-22 DIAGNOSIS — S65.311A LACERATION OF DEEP PALMAR ARCH OF RIGHT HAND, INITIAL ENCOUNTER: Primary | ICD-10-CM

## 2022-07-22 PROCEDURE — 97140 MANUAL THERAPY 1/> REGIONS: CPT | Performed by: OCCUPATIONAL THERAPIST

## 2022-07-22 PROCEDURE — 97110 THERAPEUTIC EXERCISES: CPT | Performed by: OCCUPATIONAL THERAPIST

## 2022-07-22 PROCEDURE — 97035 APP MDLTY 1+ULTRASOUND EA 15: CPT | Performed by: OCCUPATIONAL THERAPIST

## 2022-07-22 PROCEDURE — 97530 THERAPEUTIC ACTIVITIES: CPT | Performed by: OCCUPATIONAL THERAPIST

## 2022-07-22 NOTE — PROGRESS NOTES
[address] [id number]  Dept: [address] [place] [address] [address]: [address] OT Fax: [phone]    OCCUPATIONAL THERAPY   PROGRESS NOTE    Date:  2022  Initial Evaluation Date: 6/10/2022    Patient Name:  Brennon Burnham    :  1990      Restrictions/Precautions: Follow [employer] tendon repair protocol. Low fall risk  Diagnosis:  Laceration of deep palmar arch of R hand, sx with multiple repairs including [employer], R ulnar and radial digital nerves, adductor muscle - for all see below.    P83.241P (ICD-10-CM) - Laceration of deep palmar arch of right hand, initial encounter                                                       Insurance/Certification information:  [employer] (#[id number])   Date of Surgery/Injury:   Sx 2022    Plan of care signed (Y/N):  Y (thru 22)  Visit# / total visits: Eval+   (dated 2022 to 2022- 72 units each)     Referring Practitioner:  Abe Ivory MD  Specific Practitioner Orders: Splint seymour, flexor tendon protocol, modalities prn     Assessment of current deficits   [] Functional mobility             [x] ADLs          [x] Strength                  [] Cognition   [] Functional transfers           [x] IADLs         [] Safety Awareness  [x] Endurance   [x] Fine Motor Coordination    [] Balance      [] Vision/perception   [x] Sensation     [] Gross Motor Coordination [x] ROM           [x] Pain                        [x] Edema          [x] Scar Adhesion/Skin Integrity      OT PLAN OF CARE   OT POC based on physician orders, patient diagnosis and results of clinical assessment     Frequency/Duration 2-3x a week for 18 visits  Specific OT Treatment to include:      [x] Instruction in HEP                   Modalities:  [x] Therapeutic Exercise                 [x] Ultrasound               [] Electrical Stimulation/Attended  [x] PROM/Stretching                    [x] Fluidotherapy          [x]  Paraffin [x] AAROM  [x] AROM                 [] Iontophoresis:   [x] Tendon Glides                                               [] Neuromuscular Re-Ed            [] ADL/IADL re-training    [x] Therapeutic Activity                  [x] Pain Management with/without modalities PRN                 [x] Manual Therapy                      [x] Splinting                                   [x] Scar Management                   []Joint Protection/Training  []Ergonomics                             [x] Joint Mobilization                      [] Adaptive Equipment Assessment/Training                             [x] Manual Edema Mobilization   [] Myofascial Release                 [] Energy Conservation/Work Simplification  [x] GM/FM Coordination                [] Safety retraining/education per  individual diagnosis/goals  [] Desensitization        Patient Specific Goal: to regain function of his R hand                             GOALS (Long term same as Short term): updated 7-8-22  1) Patient will demonstrate good understanding of home program (exercises/activities/diagnosis/prognosis/goals) with good accuracy. (Progress noted for edema control, wound care, protective ROM, splint wear/ care)  2) Patient will demonstrate increased active/passive range of motion of their R thumb to [facility] for ADL/IADL completion. (Progress noted- see measurements on 7-8-22)  3) Patient will demonstrate increased /pinch strength of at least 10 / 5 pinch pounds of their R hand. (TBA at 8 + weeks post op per protocol)  4) Patient to report decreased pain in their affected R upper extremity from 8/10 to 2/10 or less with resistive functional use. (Progress noted- pain averages 2-3/10 with ROM exercises)  5) Increase in fine motor function as evidenced by decreased time to complete 9-hole peg test and/or MRMT test by at least 5-10 seconds. (Pt is unable to complete 9 Hole testing due to use of the thumb as a gross stabilizer only.  Pt is able to  medium sized ligt objects with the affected thumb but lacks manipulation)  6) Patient to report 100% compliance with their splint wear, care, and precautions if needed. (Progress is inconsistent- pt has recently discontinued his brace but was reminded to still use it for heavy / moderate resistive tasks to protect the repair)   7) Patient will be knowledgeable of edema control techniques as evident with decreases from severe to mild/none. (Progress noted- edema in the hand has decreased to mild except for moderate swelling around the thumb)  8) Patient will demonstrate a non-tender/non-adherent scar. (Progress noted- stitches have been removed and MD recently removed necrotic tissue from the thumb. Wound healing continues in the volar thumb. Remaining scars are tender and very tight in the surrounding tissues. )  9) Patient will report ADL functions as Mod I/I using RUE. ( Progress noted- pt states he has recently been his hand fully since his MD appointment with fair success / limited thumb use. Pt was reminded to use the hand for light tasks only and to avoid heavy resistance per protocol. Pt verbalized understanding.)  10) Patient will decrease QuickDASH score to 20% or less for increased participation in daily functional activities. (TBA)      Pain Level: ranges from 0/10 to 2/10 with ROM only/ pulling sensation  Subjective: Pt presents with no new complaints. Objective:  Updated POC to be completed by 8/10/2022.     INTERVENTION: COMPLETED: SPECIFICS/COMMENTS:   Modality:     MH x hand x 5 min to decrease stiffness   US x hand scars and thenar area x  7 min to decrease dense scarring and free up adherences   AROM / AAROM     R thumb AROM/ AAROM X  x  X  X     - Thumb AROM/ ABD, radial extension  - Blocking ex at IP and MP  - thumb alternating oppostition   - place and hold thumb flexion  - exercise balls        R wrist ROM  - modified tenodesis (relaxed wrist flexion, assitve wrist ext to neutral with passive thumb into flexion/ 10x   Coordination ex     FM activities      - screw manipulation  - in hand manipulation with marbles  - opening containers ( twist and flip tops)                  PROM/Stretching:     R thumb  X  X  X  x - IP flexion - before doing activily   -MP flexion   - composite flexion with coban stretch  - gentle ABD and radial extension        Scar Mass/Edema Control:     Retrograde massage X    x - R hand and thumb, incisional area avoided  - mesh finger sleeve on thumb. Scar  x   - To all scars and over scabbing as tolerated to loosen adherences   Strengthening:               Other:     Wound care/ hygiene x - pt has open areas in the volar thumb near the web space/ area of prior debridement is improving but remains partially scabbed   DBS  x - OK to take off for light hand use only and hygiene/ ROM exercise   HEP  x All reviewed - see above. Opt had better understanding of by the end of session. Assessment/Comments: Pt is making progress toward stated plan of care. Swelling is improved at Tx start today. AROM, PROM and stretches completed as tolerated. Scar mgmt continued manually and with the use of a scar pump. ROM continues to improve but remains limited with IP tendon excursion. Will continue.     -Rehab Potential: Good  -Requires OT Follow Up: Yes  Time In:   0910      Time Out:   1005           Visit #: Eval +7    Approved Codes: Thru 9-9-22    CODE         Mins          Units Total Used/Total Authorized  [id number] Manual 13 1 10/72   00884 Therapeutic Ex 20 1 9/72   14274 Therapeutic Activity 10 1 7/72   27069 Fluidotherapy   0/72   30535 Paraffin   0/72   27259 US 7 1 1/ 72   60874 Pending Ortho fit       No charge-  5 0    Total           -Response to Treatment: Pt feels his hand is improving. Goals: Goals for pt can be see on initial eval occurring on 6/10/22, update on 7-8-22.     Plan:   [x]  Continues Plan of care: Continue current POC including tendon glides, AROM,

## 2022-07-27 ENCOUNTER — TREATMENT (OUTPATIENT)
Dept: OCCUPATIONAL THERAPY | Age: 32
End: 2022-07-27
Payer: COMMERCIAL

## 2022-07-27 DIAGNOSIS — S65.311A LACERATION OF DEEP PALMAR ARCH OF RIGHT HAND, INITIAL ENCOUNTER: Primary | ICD-10-CM

## 2022-07-27 PROCEDURE — 97110 THERAPEUTIC EXERCISES: CPT | Performed by: OCCUPATIONAL THERAPIST

## 2022-07-27 PROCEDURE — 97018 PARAFFIN BATH THERAPY: CPT | Performed by: OCCUPATIONAL THERAPIST

## 2022-07-27 PROCEDURE — 97530 THERAPEUTIC ACTIVITIES: CPT | Performed by: OCCUPATIONAL THERAPIST

## 2022-07-27 PROCEDURE — 97140 MANUAL THERAPY 1/> REGIONS: CPT | Performed by: OCCUPATIONAL THERAPIST

## 2022-07-27 NOTE — PROGRESS NOTES
[address] [facility] [address]  Dept: [address] [place] [address] [address]: [address] OT Fax: [phone]    OCCUPATIONAL THERAPY   PROGRESS NOTE    Date:  2022  Initial Evaluation Date: 6/10/2022    Patient Name:  Kim Gallardo    :  1990      Restrictions/Precautions: Follow [employer] tendon repair protocol. Low fall risk  Diagnosis:  Laceration of deep palmar arch of R hand, sx with multiple repairs including [employer], R ulnar and radial digital nerves, adductor muscle - for all see below.    K53.509I (ICD-10-CM) - Laceration of deep palmar arch of right hand, initial encounter                                                       Insurance/Certification information:  [employer] (#[id number])   Date of Surgery/Injury:   Sx 2022    Plan of care signed (Y/N):  Y (thru 22)  Visit# / total visits: Eval+   (dated 2022 to 2022- 72 units each)     Referring Practitioner:  Delma Ordonez MD  Specific Practitioner Orders: Splint seymour, flexor tendon protocol, modalities prn     Assessment of current deficits   [] Functional mobility             [x] ADLs          [x] Strength                  [] Cognition   [] Functional transfers           [x] IADLs         [] Safety Awareness  [x] Endurance   [x] Fine Motor Coordination    [] Balance      [] Vision/perception   [x] Sensation     [] Gross Motor Coordination [x] ROM           [x] Pain                        [x] Edema          [x] Scar Adhesion/Skin Integrity      OT PLAN OF CARE   OT POC based on physician orders, patient diagnosis and results of clinical assessment     Frequency/Duration 2-3x a week for 18 visits  Specific OT Treatment to include:      [x] Instruction in HEP                   Modalities:  [x] Therapeutic Exercise                 [x] Ultrasound               [] Electrical Stimulation/Attended  [x] PROM/Stretching                    [x] Fluidotherapy          [x]  Paraffin [x] AAROM  [x] AROM                 [] Iontophoresis:   [x] Tendon Glides                                               [] Neuromuscular Re-Ed            [] ADL/IADL re-training    [x] Therapeutic Activity                  [x] Pain Management with/without modalities PRN                 [x] Manual Therapy                      [x] Splinting                                   [x] Scar Management                   []Joint Protection/Training  []Ergonomics                             [x] Joint Mobilization                      [] Adaptive Equipment Assessment/Training                             [x] Manual Edema Mobilization   [] Myofascial Release                 [] Energy Conservation/Work Simplification  [x] GM/FM Coordination                [] Safety retraining/education per  individual diagnosis/goals  [] Desensitization        Patient Specific Goal: to regain function of his R hand                             GOALS (Long term same as Short term): updated 7-8-22  1) Patient will demonstrate good understanding of home program (exercises/activities/diagnosis/prognosis/goals) with good accuracy. (Progress noted for edema control, wound care, protective ROM, splint wear/ care)  2) Patient will demonstrate increased active/passive range of motion of their R thumb to [facility] for ADL/IADL completion. (Progress noted- see measurements on 7-8-22)  3) Patient will demonstrate increased /pinch strength of at least 10 / 5 pinch pounds of their R hand. (TBA at 8 + weeks post op per protocol)  4) Patient to report decreased pain in their affected R upper extremity from 8/10 to 2/10 or less with resistive functional use. (Progress noted- pain averages 2-3/10 with ROM exercises)  5) Increase in fine motor function as evidenced by decreased time to complete 9-hole peg test and/or MRMT test by at least 5-10 seconds. (Pt is unable to complete 9 Hole testing due to use of the thumb as a gross stabilizer only.  Pt is able to  medium sized ligt objects with the affected thumb but lacks manipulation)  6) Patient to report 100% compliance with their splint wear, care, and precautions if needed. (Progress is inconsistent- pt has recently discontinued his brace but was reminded to still use it for heavy / moderate resistive tasks to protect the repair)   7) Patient will be knowledgeable of edema control techniques as evident with decreases from severe to mild/none. (Progress noted- edema in the hand has decreased to mild except for moderate swelling around the thumb)  8) Patient will demonstrate a non-tender/non-adherent scar. (Progress noted- stitches have been removed and MD recently removed necrotic tissue from the thumb. Wound healing continues in the volar thumb. Remaining scars are tender and very tight in the surrounding tissues. )  9) Patient will report ADL functions as Mod I/I using RUE. ( Progress noted- pt states he has recently been his hand fully since his MD appointment with fair success / limited thumb use. Pt was reminded to use the hand for light tasks only and to avoid heavy resistance per protocol. Pt verbalized understanding.)  10) Patient will decrease QuickDASH score to 20% or less for increased participation in daily functional activities. (TBA)      Pain Level: ranges from 0/10 to 2/10 with ROM only/ pulling sensation  Subjective: \" That thick scab just came off in the shower. \"  Objective:  Updated POC to be completed by 8/10/2022.     INTERVENTION: COMPLETED: SPECIFICS/COMMENTS:   Modality:     Paraffin x hand x 10 min to decrease stiffness and promote scar/ soft tissue elasticity   US x hand scars and thenar area   7 min to decrease dense scarring and free up adherences   AROM / AAROM     R thumb AROM/ AAROM X  x  X  X     - Thumb AROM/ ABD, radial extension  - Blocking ex at IP and MP  - thumb alternating oppostition   - place and hold thumb flexion  - exercise balls        R wrist ROM  - modified tenodesis (relaxed wrist flexion, assitve wrist ext to neutral with passive thumb into flexion/ 10x   Coordination ex     FM activities      - screw manipulation  - in hand manipulation with marbles  - opening containers ( twist and flip tops)                  PROM/Stretching:     R thumb  X  X  X  x - IP flexion - before doing activily   -MP flexion   - composite flexion with coban stretch  - gentle ABD and radial extension        Scar Mass/Edema Control:     Retrograde massage X    x - R hand and thumb, incisional area avoided  - mesh finger sleeve on thumb. Scar  X    x   - To all scars and over scabbing as tolerated to loosen adherences  - silicone provided to aid with scar softening/ decreasing tightness   Strengthening:               Other:     Wound care/ hygiene x - All scabs are gone today ! DBS  D/C - OK to take off for light hand use only and hygiene/ ROM exercise   HEP  x All reviewed - see above. Opt had better understanding of by the end of session. Assessment/Comments: Pt is making progress toward stated plan of care. The last of the volar thumb scabbing is off. Therapist was able to progress scar massage to the full incisional area. Paraffin started to today to loosen the thumb/ hand. Pt is now 8 weeks post op. Will advance into strengthening as tolerated. AROM checked today as follows:        7-8-22  7-27-22      Thumb IP  15-25  0-20      Thumb MP  10-40  15-55      Thumb ADD/ ABD  0-44  0-55      Thumb radial extension  0-50 with tendon tightness/ IP flexion  0-41      Wrist flexion  0-54  0-57 ( same as L)      Wrist ext  0-57  0-65      RD  0-15  0-40      UD  0-30  0-39       - 39#/ 45#/ 55#= av 45#  Pinch- to test in another week    -Rehab Potential: Good  -Requires OT Follow Up: Yes  Time In:   1010     Time Out:   1105           Visit #: Eval +8    Approved Codes:   Thru 9-9-22    CODE         Mins          Units Total Used/Total Authorized  [id number] Manual 15 1 11/72   37275 Therapeutic Ex 20 1 10/72   68734 Therapeutic Activity 10 1 8/72   11319 Fluidotherapy   0/72   44217 Paraffin 10 1 1/72   97837    1/ 72   29941 Pending Ortho fit       No charge-       Total           -Response to Treatment: Pt feels his hand is improving. Goals: Goals for pt can be see on initial eval occurring on 6/10/22, update on 7-8-22, ROM update 7-27-22. Plan:   [x]  Continues Plan of care: Advance into wrist and hand strengthening as tolerated. Continue tendon glides, scar management and ROM. Treatment covered based on POC and graduated to patient's progress. Pt education continues at each visit to obtain maximum benefits from skilled OT intervention.   []  Alter Plan of care:   []  Discharge:      Sanam Taylor OT /ALBERTO, [id number]

## 2022-08-03 ENCOUNTER — TREATMENT (OUTPATIENT)
Dept: OCCUPATIONAL THERAPY | Age: 32
End: 2022-08-03
Payer: COMMERCIAL

## 2022-08-03 DIAGNOSIS — S68.521A PARTIAL TRAUMATIC AMPUTATION OF RIGHT THUMB THROUGH PHALANX, INITIAL ENCOUNTER: ICD-10-CM

## 2022-08-03 DIAGNOSIS — S65.311A LACERATION OF DEEP PALMAR ARCH OF RIGHT HAND, INITIAL ENCOUNTER: Primary | ICD-10-CM

## 2022-08-03 DIAGNOSIS — S68.021A: ICD-10-CM

## 2022-08-03 DIAGNOSIS — S65.411A LACERATION OF BLOOD VESSEL OF RIGHT THUMB, INITIAL ENCOUNTER: ICD-10-CM

## 2022-08-03 PROCEDURE — 97530 THERAPEUTIC ACTIVITIES: CPT | Performed by: OCCUPATIONAL THERAPIST

## 2022-08-03 PROCEDURE — 97140 MANUAL THERAPY 1/> REGIONS: CPT | Performed by: OCCUPATIONAL THERAPIST

## 2022-08-03 PROCEDURE — 97110 THERAPEUTIC EXERCISES: CPT | Performed by: OCCUPATIONAL THERAPIST

## 2022-08-03 PROCEDURE — 97018 PARAFFIN BATH THERAPY: CPT | Performed by: OCCUPATIONAL THERAPIST

## 2022-08-03 NOTE — PROGRESS NOTES
[address] [place] [address]  Dept: [address] [place] [address] [address]: [phone]   Jojo Arita OT Fax: [phone]    OCCUPATIONAL THERAPY   PROGRESS NOTE    Date:  8/3/2022  Initial Evaluation Date: 6/10/2022    Patient Name:  Bro Boogie    :  1990      Restrictions/Precautions: Follow F[employer] tendon repair protocol. Low fall risk  Diagnosis:  Laceration of deep palmar arch of R hand, sx with multiple repairs including FPL, R ulnar and radial digital nerves, adductor muscle - for all see below.    K10.067F (ICD-10-CM) - Laceration of deep palmar arch of right hand, initial encounter                                                       Insurance/Certification information:  [employer] (#[id number])   Date of Surgery/Injury:   Sx 2022    Plan of care signed (Y/N):  Y (thru 22)  Visit# / total visits: Eval+   (dated 2022 to 2022- 72 units each)     Referring Practitioner:  Janeen Mosquera MD  Specific Practitioner Orders: Splint seymour, flexor tendon protocol, modalities prn     Assessment of current deficits   [] Functional mobility             [x] ADLs          [x] Strength                  [] Cognition   [] Functional transfers           [x] IADLs         [] Safety Awareness  [x] Endurance   [x] Fine Motor Coordination    [] Balance      [] Vision/perception   [x] Sensation     [] Gross Motor Coordination [x] ROM           [x] Pain                        [x] Edema          [x] Scar Adhesion/Skin Integrity      OT PLAN OF CARE   OT POC based on physician orders, patient diagnosis and results of clinical assessment     Frequency/Duration 2-3x a week for 18 visits  Specific OT Treatment to include:      [x] Instruction in HEP                   Modalities:  [x] Therapeutic Exercise                 [x] Ultrasound               [] Electrical Stimulation/Attended  [x] PROM/Stretching                    [x] Fluidotherapy          [x]  Paraffin  medium sized ligt objects with the affected thumb but lacks manipulation)  6) Patient to report 100% compliance with their splint wear, care, and precautions if needed. (Progress is inconsistent- pt has recently discontinued his brace but was reminded to still use it for heavy / moderate resistive tasks to protect the repair)   7) Patient will be knowledgeable of edema control techniques as evident with decreases from severe to mild/none. (Progress noted- edema in the hand has decreased to mild except for moderate swelling around the thumb)  8) Patient will demonstrate a non-tender/non-adherent scar. (Progress noted- stitches have been removed and MD recently removed necrotic tissue from the thumb. Wound healing continues in the volar thumb. Remaining scars are tender and very tight in the surrounding tissues. )  9) Patient will report ADL functions as Mod I/I using RUE. ( Progress noted- pt states he has recently been his hand fully since his MD appointment with fair success / limited thumb use. Pt was reminded to use the hand for light tasks only and to avoid heavy resistance per protocol. Pt verbalized understanding.)  10) Patient will decrease QuickDASH score to 20% or less for increased participation in daily functional activities. (TBA)      Pain Level: no pain reported at the start or throughout the session  Subjective: \"I am doing good, no pain today\"  Objective:  Updated POC to be completed by 8/10/2022.     INTERVENTION: COMPLETED: SPECIFICS/COMMENTS:   Modality:     Paraffin x hand x 10 min to decrease stiffness and promote scar/ soft tissue elasticity   US x hand scars and thenar area   7 min to decrease dense scarring and free up adherences   AROM / AAROM     R thumb AROM/ AAROM X  x  X  X     - Thumb AROM/ ABD, radial extension  - Blocking ex at IP and MP  - thumb alternating oppostition   - place and hold thumb flexion  - exercise balls        R wrist ROM  - modified tenodesis (relaxed wrist flexion, assitve wrist ext to neutral with passive thumb into flexion/ 10x   Coordination ex     FM activities   X    x - screw manipulation  - in hand manipulation with marbles  - opening containers ( twist and flip tops)  -opposition with pennies into a container                  PROM/Stretching:     R thumb  X  X  X  x - IP flexion - before doing activily   -MP flexion   - composite flexion with coban stretch  - gentle ABD and radial extension        Scar Mass/Edema Control:     Retrograde massage X    x - R hand and thumb, incisional area avoided  - mesh finger sleeve on thumb. Scar  X    x   - To all scars and over scabbing as tolerated to loosen adherences  - silicone provided to aid with scar softening/ decreasing tightness   Strengthening:     digital x Light resistive putty gross gripping, rolling and light pinches        Other:     Wound care/ hygiene x - All scabs are gone today ! DBS  D/C - OK to take off for light hand use only and hygiene/ ROM exercise   HEP  x All reviewed - see above. Opt had better understanding of by the end of session. Assessment/Comments: Pt is making progress toward stated plan of care. Initiated light strengthening this date with light soft putty, tolerated well with no increase in pain. Will increase as tolerated and indicated by physician.       -Rehab Potential: Good  -Requires OT Follow Up: Yes    Time In:   2:00    Time Out:   3:00          Visit #: Eval +9    Approved Codes: Thru 9-9-22    CODE         Mins          Units Total Used/Total Authorized  92401 Manual 15 1 12/72   38083 Therapeutic Ex 20 1 11/72   43485 Therapeutic Activity 10 1 9/72   55432 Fluidotherapy   0/72   42798 Paraffin 10 1 2/72   47098    1/ 72   06145 Pending Ortho fit       No charge-       Total           -Response to Treatment: Pt feels his hand is improving. Goals: Goals for pt can be see on initial eval occurring on 6/10/22, update on 7-8-22, ROM update 7-27-22.     Plan:   [x] Continues Plan of care: Advance into wrist and hand strengthening as tolerated. Continue tendon glides, scar management and ROM. Treatment covered based on POC and graduated to patient's progress. Pt education continues at each visit to obtain maximum benefits from skilled OT intervention. []  Alter Plan of care:   []  Discharge:       [address] [facility], OTR/L #[id number]

## 2022-08-05 ENCOUNTER — TREATMENT (OUTPATIENT)
Dept: OCCUPATIONAL THERAPY | Age: 32
End: 2022-08-05
Payer: COMMERCIAL

## 2022-08-05 DIAGNOSIS — S65.411A LACERATION OF BLOOD VESSEL OF RIGHT THUMB, INITIAL ENCOUNTER: ICD-10-CM

## 2022-08-05 DIAGNOSIS — S68.521A PARTIAL TRAUMATIC AMPUTATION OF RIGHT THUMB THROUGH PHALANX, INITIAL ENCOUNTER: ICD-10-CM

## 2022-08-05 DIAGNOSIS — S65.311A LACERATION OF DEEP PALMAR ARCH OF RIGHT HAND, INITIAL ENCOUNTER: Primary | ICD-10-CM

## 2022-08-05 DIAGNOSIS — S68.021A: ICD-10-CM

## 2022-08-05 PROCEDURE — 97018 PARAFFIN BATH THERAPY: CPT | Performed by: OCCUPATIONAL THERAPIST

## 2022-08-05 PROCEDURE — 97110 THERAPEUTIC EXERCISES: CPT | Performed by: OCCUPATIONAL THERAPIST

## 2022-08-05 PROCEDURE — 97140 MANUAL THERAPY 1/> REGIONS: CPT | Performed by: OCCUPATIONAL THERAPIST

## 2022-08-05 NOTE — PROGRESS NOTES
[address] [place] [address]  Dept: [address] [place] [address] [address]: [address] OT Fax: [phone]    OCCUPATIONAL THERAPY   PROGRESS NOTE    Date:  2022  Initial Evaluation Date: 6/10/2022    Patient Name:  Pepe Prieto    :  1990      Restrictions/Precautions: Follow [employer] tendon repair protocol. Low fall risk  Diagnosis:  Laceration of deep palmar arch of R hand, sx with multiple repairs including F[employer], R ulnar and radial digital nerves, adductor muscle - for all see below.    K54.221R (ICD-10-CM) - Laceration of deep palmar arch of right hand, initial encounter                                                       Insurance/Certification information:  [employer] (#[id number])   Date of Surgery/Injury:   Sx 2022    Plan of care signed (Y/N):  Y (thru 22)  Visit# / total visits: Eval+10 / 18   (dated 2022 to 2022- 72 units each)     Referring Practitioner:  Abigail Givens MD  Specific Practitioner Orders: Splint seymour, flexor tendon protocol, modalities prn     Assessment of current deficits   [] Functional mobility             [x] ADLs          [x] Strength                  [] Cognition   [] Functional transfers           [x] IADLs         [] Safety Awareness  [x] Endurance   [x] Fine Motor Coordination    [] Balance      [] Vision/perception   [x] Sensation     [] Gross Motor Coordination [x] ROM           [x] Pain                        [x] Edema          [x] Scar Adhesion/Skin Integrity      OT PLAN OF CARE   OT POC based on physician orders, patient diagnosis and results of clinical assessment     Frequency/Duration 2-3x a week for 18 visits  Specific OT Treatment to include:      [x] Instruction in HEP                   Modalities:  [x] Therapeutic Exercise                 [x] Ultrasound               [] Electrical Stimulation/Attended  [x] PROM/Stretching                    [x] Fluidotherapy          [x]  Paraffin [x] AAROM  [x] AROM                 [] Iontophoresis:   [x] Tendon Glides                                               [] Neuromuscular Re-Ed            [] ADL/IADL re-training    [x] Therapeutic Activity                  [x] Pain Management with/without modalities PRN                 [x] Manual Therapy                      [x] Splinting                                   [x] Scar Management                   []Joint Protection/Training  []Ergonomics                             [x] Joint Mobilization                      [] Adaptive Equipment Assessment/Training                             [x] Manual Edema Mobilization   [] Myofascial Release                 [] Energy Conservation/Work Simplification  [x] GM/FM Coordination                [] Safety retraining/education per  individual diagnosis/goals  [] Desensitization        Patient Specific Goal: to regain function of his R hand                             GOALS (Long term same as Short term): updated 7-8-22  1) Patient will demonstrate good understanding of home program (exercises/activities/diagnosis/prognosis/goals) with good accuracy. (Progress noted for edema control, wound care, protective ROM, splint wear/ care)  2) Patient will demonstrate increased active/passive range of motion of their R thumb to [facility] for ADL/IADL completion. (Progress noted- see measurements on 7-8-22)  3) Patient will demonstrate increased /pinch strength of at least 10 / 5 pinch pounds of their R hand. (TBA at 8 + weeks post op per protocol)  4) Patient to report decreased pain in their affected R upper extremity from 8/10 to 2/10 or less with resistive functional use. (Progress noted- pain averages 2-3/10 with ROM exercises)  5) Increase in fine motor function as evidenced by decreased time to complete 9-hole peg test and/or MRMT test by at least 5-10 seconds. (Pt is unable to complete 9 Hole testing due to use of the thumb as a gross stabilizer only.  Pt is able to  medium sized ligt objects with the affected thumb but lacks manipulation)  6) Patient to report 100% compliance with their splint wear, care, and precautions if needed. (Progress is inconsistent- pt has recently discontinued his brace but was reminded to still use it for heavy / moderate resistive tasks to protect the repair)   7) Patient will be knowledgeable of edema control techniques as evident with decreases from severe to mild/none. (Progress noted- edema in the hand has decreased to mild except for moderate swelling around the thumb)  8) Patient will demonstrate a non-tender/non-adherent scar. (Progress noted- stitches have been removed and MD recently removed necrotic tissue from the thumb. Wound healing continues in the volar thumb. Remaining scars are tender and very tight in the surrounding tissues. )  9) Patient will report ADL functions as Mod I/I using RUE. ( Progress noted- pt states he has recently been his hand fully since his MD appointment with fair success / limited thumb use. Pt was reminded to use the hand for light tasks only and to avoid heavy resistance per protocol. Pt verbalized understanding.)  10) Patient will decrease QuickDASH score to 20% or less for increased participation in daily functional activities. (TBA)      Pain Level: no pain reported at the start or throughout the session  Subjective: Pt reports no new changes  Objective:  Updated POC to be completed by 8/10/2022.     INTERVENTION: COMPLETED: SPECIFICS/COMMENTS:   Modality:     Paraffin x hand x 10 min to decrease stiffness and promote scar/ soft tissue elasticity   US x hand scars and thenar area   7 min to decrease dense scarring and free up adherences   AROM / AAROM     R thumb AROM/ AAROM X    X       - Thumb AROM/ ABD, radial extension  - Blocking ex at IP and MP  - thumb alternating oppostition   - place and hold thumb flexion  - exercise balls        R wrist ROM  - modified tenodesis (relaxed wrist flexion, assitve wrist ext to neutral with passive thumb into flexion/ 10x   Coordination ex     FM activities   X    x - screw manipulation  - in hand manipulation with marbles  - opening containers ( twist and flip tops)  -opposition with pennies into a container                  PROM/Stretching:     R thumb  X  X  X  x - IP flexion - before doing activily   -MP flexion   - composite flexion with coban stretch  - gentle ABD and radial extension        Scar Mass/Edema Control:     Retrograde massage X    x - R hand and thumb, incisional area avoided  - mesh finger sleeve on thumb. Scar  X    x   - To all scars and over scabbing as tolerated to loosen adherences  - silicone provided to aid with scar softening/ decreasing tightness   Strengthening:     digital X    x Light resistive putty gross gripping, rolling and light pinches  -light resistive clothes pins to  pom poms         Other:     Wound care/ hygiene x - All scabs are gone today ! DBS  D/C - OK to take off for light hand use only and hygiene/ ROM exercise   HEP  x All reviewed - see above. Opt had better understanding of by the end of session. Assessment/Comments: Pt is making progress toward stated plan of care. Continued strengthening this date-tolerated well with no increase in pain. . Will increase as tolerated and indicated by physician.       -Rehab Potential: Good  -Requires OT Follow Up: Yes    Time In:   1015   Time Out:   11:00        Visit #: Eval +10    Approved Codes: Thru 9-9-22    CODE         Mins          Units Total Used/Total Authorized  [id number] Manual 15 1 13/72   93553 Therapeutic Ex 20 1 12/72   96374 Therapeutic Activity   9/72   39643 Fluidotherapy   0/72   79456 Paraffin 10 1 3/72   06540    1/ 72   71348 Pending Ortho fit       No charge-       Total           -Response to Treatment: Pt feels his hand is improving.    Goals: Goals for pt can be see on initial eval occurring on 6/10/22, update on 7-8-22, ROM update 7-27-22. Plan:   [x]  Continues Plan of care: Advance into wrist and hand strengthening as tolerated. Continue tendon glides, scar management and ROM. Treatment covered based on POC and graduated to patient's progress. Pt education continues at each visit to obtain maximum benefits from skilled OT intervention. []  Alter Plan of care:   []  Discharge:       [address] [facility], OTR/L #[id number]

## 2022-08-12 ENCOUNTER — TREATMENT (OUTPATIENT)
Dept: OCCUPATIONAL THERAPY | Age: 32
End: 2022-08-12
Payer: COMMERCIAL

## 2022-08-12 DIAGNOSIS — S68.521A PARTIAL TRAUMATIC AMPUTATION OF RIGHT THUMB THROUGH PHALANX, INITIAL ENCOUNTER: ICD-10-CM

## 2022-08-12 DIAGNOSIS — S65.311A LACERATION OF DEEP PALMAR ARCH OF RIGHT HAND, INITIAL ENCOUNTER: Primary | ICD-10-CM

## 2022-08-12 DIAGNOSIS — S68.021A: ICD-10-CM

## 2022-08-12 DIAGNOSIS — S65.411A LACERATION OF BLOOD VESSEL OF RIGHT THUMB, INITIAL ENCOUNTER: ICD-10-CM

## 2022-08-12 PROCEDURE — 97110 THERAPEUTIC EXERCISES: CPT | Performed by: OCCUPATIONAL THERAPIST

## 2022-08-12 PROCEDURE — 97140 MANUAL THERAPY 1/> REGIONS: CPT | Performed by: OCCUPATIONAL THERAPIST

## 2022-08-12 PROCEDURE — 97018 PARAFFIN BATH THERAPY: CPT | Performed by: OCCUPATIONAL THERAPIST

## 2022-08-12 PROCEDURE — 97530 THERAPEUTIC ACTIVITIES: CPT | Performed by: OCCUPATIONAL THERAPIST

## 2022-08-12 NOTE — PROGRESS NOTES
[address] [place] [id number]  Dept: [address] [place] [address] [address]: [address] OT Fax: [phone]    OCCUPATIONAL THERAPY   PROGRESS NOTE/ RE-EVALUATE    Date:  2022  Initial Evaluation Date: 6/10/2022    Patient Name:  Aziza Soliman    :  1990      Restrictions/Precautions: Follow F[employer] tendon repair protocol. Low fall risk  Diagnosis:  Laceration of deep palmar arch of R hand, sx with multiple repairs including FPL, R ulnar and radial digital nerves, adductor muscle - for all see below.    Z16.156Z (ICD-10-CM) - Laceration of deep palmar arch of right hand, initial encounter                                                       Insurance/Certification information:  [employer] (#[id number])   Date of Surgery/Injury:   Sx 2022    Plan of care signed (Y/N):  Y (thru 22)  Visit# / total visits: Eval+   (dated 2022 to 2022- 72 units each)     Referring Practitioner:  Son Whittaker MD  Specific Practitioner Orders: Splint seymour, flexor tendon protocol, modalities prn     Assessment of current deficits   [] Functional mobility             [x] ADLs          [x] Strength                  [] Cognition   [] Functional transfers           [x] IADLs         [] Safety Awareness  [x] Endurance   [x] Fine Motor Coordination    [] Balance      [] Vision/perception   [x] Sensation     [] Gross Motor Coordination [x] ROM           [x] Pain                        [x] Edema          [x] Scar Adhesion/Skin Integrity      OT PLAN OF CARE   OT POC based on physician orders, patient diagnosis and results of clinical assessment     Frequency/Duration 2-3x a week for 18 visits  Specific OT Treatment to include:      [x] Instruction in HEP                   Modalities:  [x] Therapeutic Exercise                 [x] Ultrasound               [] Electrical Stimulation/Attended  [x] PROM/Stretching                    [x] Fluidotherapy          [x] Paraffin                   [x] AAROM  [x] AROM                 [] Iontophoresis:   [x] Tendon Glides                                               [] Neuromuscular Re-Ed            [] ADL/IADL re-training    [x] Therapeutic Activity                  [x] Pain Management with/without modalities PRN                 [x] Manual Therapy                      [x] Splinting                                   [x] Scar Management                   []Joint Protection/Training  []Ergonomics                             [x] Joint Mobilization                      [] Adaptive Equipment Assessment/Training                             [x] Manual Edema Mobilization   [] Myofascial Release                 [] Energy Conservation/Work Simplification  [x] GM/FM Coordination                [] Safety retraining/education per  individual diagnosis/goals  [] Desensitization        Patient Specific Goal: to regain function of his R hand                             GOALS (Long term same as Short term): updated 8-12-22  1) Patient will demonstrate good understanding of home program (exercises/activities/diagnosis/prognosis/goals) with good accuracy. (Goal met for edema control, wound care, protective ROM, splint wear/ care, scar mgmt, stretches and hand strengthening)  2) Patient will demonstrate increased active/passive range of motion of their R thumb to [facility] for ADL/IADL completion. (Progress noted- see measurements on 8-12-22)  3) Patient will demonstrate increased /pinch strength of at least 10 / 5 pinch pounds of their R hand. (Goal met)  R - 85# from 45#  R lateral pinch- 15#  R 3 jaw- 12#  4) Patient to report decreased pain in their affected R upper extremity from 8/10 to 2/10 or less with resistive functional use.  (Goal met- pain has resolved)  5) Increase in fine motor function as evidenced by decreased time to complete 9-hole peg test and/or MRMT test by at least 5-10 seconds. (TBA)  6) Patient to report 100% compliance with their splint wear, care, and precautions if needed. (Goal met- all splints are d/c)  7) Patient will be knowledgeable of edema control techniques as evident with decreases from severe to mild/none. (Goal met- edema has resolved)  8) Patient will demonstrate a non-tender/non-adherent scar. (Progress noted- all scars are well healed but are sensitive to pressure)   9) Patient will report ADL functions as Mod I/I using RUE. (Goal met- Pt is I with self care and home mgmt tasks. He is questioning RTW. Pt is encouraged to discuss with dr Alesia Fisher. )    10) Patient will decrease QuickDASH score to 20% or less for increased participation in daily functional activities. (TBA)      Pain Level: No pain  Subjective:  Pt reports no new changes  Objective:  Updated POC to be completed by 8/10/2022.     INTERVENTION: COMPLETED: SPECIFICS/COMMENTS:   Modality:     Paraffin x hand x 10 min to decrease stiffness and promote scar/ soft tissue elasticity   US x hand scars and thenar area   7 min to decrease dense scarring and free up adherences   AROM / AAROM     R thumb AROM/ AAROM X  x  X       - Thumb AROM/ ABD, radial extension  - Blocking ex at IP and MP  - thumb alternating oppostition   - place and hold thumb flexion  - exercise balls        Coordination ex     FM activities        - screw manipulation  - in hand manipulation with marbles  - opening containers ( twist and flip tops)  -opposition with pennies into a container   PROM/Stretching:     R thumb  X  X  X  x - IP flexion - before doing activily   -MP flexion   - composite flexion with coban stretch  - ABD and radial extension        Scar Mass/Edema Control:          Scar  X       - To all scars as tolerated  - silicone provided to aid with scar softening/ decreasing tightness   Strengthening:     digital      Light resistive putty gross gripping, rolling and light pinches  -light resistive clothes pins to  pom poms         Other:     Work readiness x - discussed use of weight lifting gloves and gel inserts to rubber gloves to protect from sheer forces on the scars        HEP  x Scar massage, stretches to thumb, FRANCIS, hand strengthening      Assessment/Comments: Pt is making progress toward stated plan of care. Status is updated to show good progress in all areas. The main issue is scar sensitivity and dense scars in the volar thumb with adherence to FPL. Pain has resolved and pt is able perform most daily tasks including meal prep and lifting boxes/ pans at home. Will continue OT at this point at 1x/ week x 3-4 weeks to further address tendon excursion in the thumb and scar mgmt in preparation for possible RTW. AROM checked today as follows:        7-8-22 8-12-22      Thumb IP  15-25  0-30      Thumb MP  10-40  0-60      Thumb ADD/ ABD  0-44  0-60      Thumb radial extension  0-50 with tendon tightness/ IP flexion  0-40 with IP in ext      Wrist flexion  0-54  0-81      Wrist ext  0-57  0-63      RD  0-15  0-35      UD  0-30  0-50      comments: full opposition    R - 85# from 45#  R lateral pinch- 15#  R 3 jaw- 12#    -Rehab Potential: Good  -Requires OT Follow Up: Yes    Time In:   1405   Time Out:  1500         Visit #: Eval +11    Approved Codes: Thru 9-9-22    CODE         Mins          Units Total Used/Total Authorized  60953 Manual 15 1 14/72   01833 Therapeutic Ex 15 1 13/72   46372 Therapeutic Activity 15 1 10/72   59453 Fluidotherapy   0/72   73217 Paraffin 10 1 4/72   85540    1/ 72   01793 Pending Ortho fit       No charge-       Total           -Response to Treatment: Pt feels his hand is improving. Goals: Goals for pt can be see on initial eval occurring on 6/10/22, update on 7-8-22, ROM update 7-27-22. Plan:   [x]  Continue Plan of care: Advance into wrist and hand strengthening as tolerated. Continue tendon glides, scar management and ROM. Treatment covered based on POC and graduated to patient's progress.  Pt education continues at each visit to obtain maximum benefits from skilled OT intervention.   []  [address] of care:   []  Discharge:      Andrey Pedersen OT/ALBERTO  [id number]

## 2022-08-19 ENCOUNTER — TREATMENT (OUTPATIENT)
Dept: OCCUPATIONAL THERAPY | Age: 32
End: 2022-08-19
Payer: COMMERCIAL

## 2022-08-19 DIAGNOSIS — S65.411A LACERATION OF BLOOD VESSEL OF RIGHT THUMB, INITIAL ENCOUNTER: ICD-10-CM

## 2022-08-19 DIAGNOSIS — S68.021A: ICD-10-CM

## 2022-08-19 DIAGNOSIS — S65.311A LACERATION OF DEEP PALMAR ARCH OF RIGHT HAND, INITIAL ENCOUNTER: Primary | ICD-10-CM

## 2022-08-19 DIAGNOSIS — S68.521A PARTIAL TRAUMATIC AMPUTATION OF RIGHT THUMB THROUGH PHALANX, INITIAL ENCOUNTER: ICD-10-CM

## 2022-08-19 PROCEDURE — 97018 PARAFFIN BATH THERAPY: CPT | Performed by: OCCUPATIONAL THERAPIST

## 2022-08-19 PROCEDURE — 97110 THERAPEUTIC EXERCISES: CPT | Performed by: OCCUPATIONAL THERAPIST

## 2022-08-19 PROCEDURE — 97140 MANUAL THERAPY 1/> REGIONS: CPT | Performed by: OCCUPATIONAL THERAPIST

## 2022-08-19 NOTE — PROGRESS NOTES
[address] [place] [address]  Dept: [address] [place] [address] [address]: [address] OT Fax: [phone]    OCCUPATIONAL THERAPY   PROGRESS NOTE    Date:  2022  Initial Evaluation Date: 6/10/2022    Patient Name:  Willie Bagley    :  1990      Restrictions/Precautions: Follow FPL tendon repair protocol. Low fall risk  Diagnosis:  Laceration of deep palmar arch of R hand, sx with multiple repairs including FPL, R ulnar and radial digital nerves, adductor muscle - for all see below.    S07.963S (ICD-10-CM) - Laceration of deep palmar arch of right hand, initial encounter                                                       Insurance/Certification information:  [employer] (#[id number])   Date of Surgery/Injury:   Sx 2022    Plan of care signed (Y/N):  Y (thru 22)  Visit# / total visits: Eval+   (dated 2022 to 2022- 72 units each)     Referring Practitioner:  Ran Multani MD  Specific Practitioner Orders: Splint seymour, flexor tendon protocol, modalities prn     Assessment of current deficits   [] Functional mobility             [x] ADLs          [x] Strength                  [] Cognition   [] Functional transfers           [x] IADLs         [] Safety Awareness  [x] Endurance   [x] Fine Motor Coordination    [] Balance      [] Vision/perception   [x] Sensation     [] Gross Motor Coordination [x] ROM           [x] Pain                        [x] Edema          [x] Scar Adhesion/Skin Integrity      OT PLAN OF CARE   OT POC based on physician orders, patient diagnosis and results of clinical assessment     Frequency/Duration 2-3x a week for 18 visits  Specific OT Treatment to include:      [x] Instruction in HEP                   Modalities:  [x] Therapeutic Exercise                 [x] Ultrasound               [] Electrical Stimulation/Attended  [x] PROM/Stretching                    [x] Fluidotherapy          [x]  Paraffin [x] AAROM  [x] AROM                 [] Iontophoresis:   [x] Tendon Glides                                               [] Neuromuscular Re-Ed            [] ADL/IADL re-training    [x] Therapeutic Activity                  [x] Pain Management with/without modalities PRN                 [x] Manual Therapy                      [x] Splinting                                   [x] Scar Management                   []Joint Protection/Training  []Ergonomics                             [x] Joint Mobilization                      [] Adaptive Equipment Assessment/Training                             [x] Manual Edema Mobilization   [] Myofascial Release                 [] Energy Conservation/Work Simplification  [x] GM/FM Coordination                [] Safety retraining/education per  individual diagnosis/goals  [] Desensitization        Patient Specific Goal: to regain function of his R hand                             GOALS (Long term same as Short term): updated 8-12-22  1) Patient will demonstrate good understanding of home program (exercises/activities/diagnosis/prognosis/goals) with good accuracy. (Goal met for edema control, wound care, protective ROM, splint wear/ care, scar mgmt, stretches and hand strengthening)  2) Patient will demonstrate increased active/passive range of motion of their R thumb to [facility] for ADL/IADL completion. (Progress noted- see measurements on 8-12-22)  3) Patient will demonstrate increased /pinch strength of at least 10 / 5 pinch pounds of their R hand. (Goal met)  R - 85# from 45#  R lateral pinch- 15#  R 3 jaw- 12#  4) Patient to report decreased pain in their affected R upper extremity from 8/10 to 2/10 or less with resistive functional use.  (Goal met- pain has resolved)  5) Increase in fine motor function as evidenced by decreased time to complete 9-hole peg test and/or MRMT test by at least 5-10 seconds. (TBA)  6) Patient to report 100% compliance with their splint wear, care, and precautions if needed. (Goal met- all splints are d/c)  7) Patient will be knowledgeable of edema control techniques as evident with decreases from severe to mild/none. (Goal met- edema has resolved)  8) Patient will demonstrate a non-tender/non-adherent scar. (Progress noted- all scars are well healed but are sensitive to pressure)   9) Patient will report ADL functions as Mod I/I using RUE. (Goal met- Pt is I with self care and home mgmt tasks. He is questioning RTW. Pt is encouraged to discuss with dr Amos Romero. )    10) Patient will decrease QuickDASH score to 20% or less for increased participation in daily functional activities. (TBA)      Pain Level: No pain  Subjective:  Pt reports no new changes  Objective:  Updated POC to be completed by 9/10/2022.     INTERVENTION: COMPLETED: SPECIFICS/COMMENTS:   Modality:     Paraffin x hand x 10 min to decrease stiffness and promote scar/ soft tissue elasticity   US x hand scars and thenar area   7 min to decrease dense scarring and free up adherences   AROM / AAROM     R thumb AROM/ AAROM X  x  X  X  X  x     - Thumb AROM/ ABD, radial extension  - Blocking ex at IP and MP  - thumb alternating oppostition   - place and hold thumb flexion  - exercise balls/ tiring- challenging CCLW  - thumb towel ex 10x        Coordination ex     FM activities     x     - screw manipulation  - in hand manipulation with marbles  - opening containers ( twist and flip tops)  -opposition with pennies into a container   PROM/Stretching:     R thumb  X  X  X  x - IP flexion - before doing activily   -MP flexion   - composite flexion  - ABD and radial extension        Scar Mass/Edema Control:          Scar  X  x       - To all scars as tolerated  - silicone home use to aid with scar softening/ decreasing tightness   Strengthening:     digital      Light resistive putty gross gripping, rolling and light pinches  -light resistive clothes pins to  pom poms   - velcro board ( key roll, tabs with alternating prehension        Other:     Work readiness x - discussed use of weight lifting gloves and gel inserts to rubber gloves to protect from sheer forces on the scars        HEP  x Scar massage, stretches to thumb, FRANCIS, hand strengthening      Assessment/Comments: Pt is making progress toward stated plan of care. Improvements are noted with most areas. Scar mgmt, ROM, tendon glides and hand strengthening continued today as tolerated. Slight flexion continues at the IP but remains restricted by scarring. Will continue.     -Rehab Potential: Good  -Requires OT Follow Up: Yes    Time In:   1405   Time Out:  1450        Visit #: Eval +12    Approved Codes: Thru 9-9-22    CODE         Mins          Units Total Used/Total Authorized  66896 Manual 20 1 15/72   51664 Therapeutic Ex 15 1 14/72   38589 Therapeutic Activity   10/72   06686 Fluidotherapy   0/72   89241 Paraffin 10 1 5/72   24104    1/ 72   46268 Pending Ortho fit       No charge- MH      Total           -Response to Treatment: Pt fis hoping to RTW following his next MD appt. Goals: Goals for pt can be see on initial eval occurring on 6/10/22, update on 7-8-22, ROM update 7-27-22. Plan:   [x]  Continue Plan of care: Advance into wrist and hand strengthening as tolerated. Continue tendon glides, scar management and ROM. Treatment covered based on POC and graduated to patient's progress. Pt education continues at each visit to obtain maximum benefits from skilled OT intervention.   []  [facility] of care:   []  Discharge:      Janessa Edmond, OT/L  [id number]

## 2022-09-13 ENCOUNTER — OFFICE VISIT (OUTPATIENT)
Dept: ORTHOPEDIC SURGERY | Age: 32
End: 2022-09-13
Payer: COMMERCIAL

## 2022-09-13 VITALS — HEIGHT: 67 IN | BODY MASS INDEX: 33.74 KG/M2 | RESPIRATION RATE: 20 BRPM | WEIGHT: 215 LBS

## 2022-09-13 DIAGNOSIS — S65.311A LACERATION OF DEEP PALMAR ARCH OF RIGHT HAND, INITIAL ENCOUNTER: Primary | ICD-10-CM

## 2022-09-13 PROCEDURE — G8417 CALC BMI ABV UP PARAM F/U: HCPCS | Performed by: ORTHOPAEDIC SURGERY

## 2022-09-13 PROCEDURE — 1036F TOBACCO NON-USER: CPT | Performed by: ORTHOPAEDIC SURGERY

## 2022-09-13 PROCEDURE — G8427 DOCREV CUR MEDS BY ELIG CLIN: HCPCS | Performed by: ORTHOPAEDIC SURGERY

## 2022-09-13 PROCEDURE — 99212 OFFICE O/P EST SF 10 MIN: CPT | Performed by: ORTHOPAEDIC SURGERY

## 2022-09-13 NOTE — PROGRESS NOTES
325 MG EC tablet, Take 1 tablet by mouth daily, Disp: 30 tablet, Rfl: 0    naproxen (NAPROSYN) 500 MG tablet, Take 1 tablet by mouth 2 times daily for 10 days. , Disp: 20 tablet, Rfl: 0  Allergies   Allergen Reactions    Penicillins Hives     Social History     Socioeconomic History    Marital status: Single     Spouse name: Not on file    Number of children: Not on file    Years of education: Not on file    Highest education level: Not on file   Occupational History    Not on file   Tobacco Use    Smoking status: Never    Smokeless tobacco: Never   Substance and Sexual Activity    Alcohol use: Yes     Comment: occ    Drug use: Yes     Types: Marijuana Ignacio Tijerina)    Sexual activity: Never   Other Topics Concern    Not on file   Social History Narrative    Not on file     Social Determinants of Health     Financial Resource Strain: Not on file   Food Insecurity: Not on file   Transportation Needs: Not on file   Physical Activity: Not on file   Stress: Not on file   Social Connections: Not on file   Intimate Partner Violence: Not on file   Housing Stability: Not on file     History reviewed. No pertinent family history. Skin: (-) rash,(-) psoriasis,(-) eczema, (-)skin cancer. Musculoskeletal: left thumb stiffness  Neurologic: (-) epilepsy, (-)seizures,(-) brain tumor,(-) TIA, (-)stroke, (-)headaches, (-)Parkinson disease,(-) memory loss, (-) LOC. Cardiovascular: (-) Chest pain, (-) swelling in legs/feet, (-) SOB, (-) cramping in legs/feet with walking. SUBJECTIVE:      Constitutional:    The patient is alert and oriented x 3, appears to be stated age and in no distress. Left upper extremity: Incision healing well. There is a scar contracture present to the thumb. Brisk capillary refill to the thumb. No erythema or signs of infection. FPL is intact with pull-through of the tendon. He does have diminished but intact sensation to the radial and ulnar digital nerve. Otherwise remains NVI.        Impression: 3.5 months post op    Plan:   Patient can advance activities as tolerated. No restrictions. Okay to return to work as a . Did discuss if his scar contracture becomes an issue in the future this could be addressed at a later date. Patient to follow-up as needed. All questions answered    I have seen and evaluated the patient and agree with the above assessment and plan on today's visit. I have performed the key components of the history and physical examination with significant findings of doing very well 3-1/2 months postop right thumb. I concur with the findings and plan as documented.     Radha Sosa MD  9/13/2022

## 2022-09-13 NOTE — LETTER
[facility].  [address] [address]  Phone: [phone]  Fax: [phone]    Alex Sheets MD        September 13, 2022     Patient: Roxanne Litten   YOB: 1990   Date of Visit: 9/13/2022       To Whom It May Concern: It is my medical opinion that Roxanne Litten may return to full duty immediately with no restrictions. If you have any questions or concerns, please don't hesitate to call.     Sincerely,        Alex Sheets MD

## 2024-01-15 NOTE — LETTER
[facility].  [address] [address]  Phone: [phone]  Fax: [phone]    August Yarbrough MD        June 30, 2022     Patient: Marychuy Liu   YOB: 1990   Date of Visit: 6/30/2022       To Whom It May Concern: It is my medical opinion that Marychuy Liu should remain out of work until 08/15/2022. If you have any questions or concerns, please don't hesitate to call.     Sincerely,        August Yarbrough MD Price (Do Not Change): 0.00 Detail Level: Simple Instructions: This plan will send the code FBSE to the PM system.  DO NOT or CHANGE the price.

## 2025-02-22 ENCOUNTER — APPOINTMENT (OUTPATIENT)
Dept: GENERAL RADIOLOGY | Age: 35
End: 2025-02-22

## 2025-02-22 ENCOUNTER — HOSPITAL ENCOUNTER (EMERGENCY)
Age: 35
Discharge: HOME | End: 2025-02-22
Attending: EMERGENCY MEDICINE

## 2025-02-22 VITALS
BODY MASS INDEX: 39.24 KG/M2 | WEIGHT: 250 LBS | TEMPERATURE: 98.4 F | HEART RATE: 75 BPM | HEIGHT: 67 IN | OXYGEN SATURATION: 97 % | SYSTOLIC BLOOD PRESSURE: 102 MMHG | DIASTOLIC BLOOD PRESSURE: 57 MMHG

## 2025-02-22 DIAGNOSIS — M62.838 SPASM OF MUSCLE: Primary | ICD-10-CM

## 2025-02-22 DIAGNOSIS — M25.512 LEFT SHOULDER PAIN, UNSPECIFIED CHRONICITY: ICD-10-CM

## 2025-02-22 LAB
ANION GAP SERPL CALCULATED.3IONS-SCNC: 12 MMOL/L (ref 7–16)
BASOPHILS # BLD: 0.03 K/UL (ref 0–0.2)
BASOPHILS NFR BLD: 1 % (ref 0–2)
BUN SERPL-MCNC: 17 MG/DL (ref 6–20)
CALCIUM SERPL-MCNC: 9.2 MG/DL (ref 8.6–10.2)
CHLORIDE SERPL-SCNC: 102 MMOL/L (ref 98–107)
CO2 SERPL-SCNC: 22 MMOL/L (ref 22–29)
CREAT SERPL-MCNC: 0.7 MG/DL (ref 0.7–1.2)
EKG ATRIAL RATE: 66 BPM
EKG P AXIS: 24 DEGREES
EKG P-R INTERVAL: 158 MS
EKG Q-T INTERVAL: 396 MS
EKG QRS DURATION: 98 MS
EKG QTC CALCULATION (BAZETT): 415 MS
EKG R AXIS: 88 DEGREES
EKG T AXIS: 28 DEGREES
EKG VENTRICULAR RATE: 66 BPM
EOSINOPHIL # BLD: 0.18 K/UL (ref 0.05–0.5)
EOSINOPHILS RELATIVE PERCENT: 3 % (ref 0–6)
ERYTHROCYTE [DISTWIDTH] IN BLOOD BY AUTOMATED COUNT: 12.3 % (ref 11.5–15)
GFR, ESTIMATED: >90 ML/MIN/1.73M2
GLUCOSE SERPL-MCNC: 112 MG/DL (ref 74–99)
HCT VFR BLD AUTO: 43.6 % (ref 37–54)
HGB BLD-MCNC: 14.7 G/DL (ref 12.5–16.5)
IMM GRANULOCYTES # BLD AUTO: <0.03 K/UL (ref 0–0.58)
IMM GRANULOCYTES NFR BLD: 0 % (ref 0–5)
LYMPHOCYTES NFR BLD: 1.57 K/UL (ref 1.5–4)
LYMPHOCYTES RELATIVE PERCENT: 28 % (ref 20–42)
MCH RBC QN AUTO: 30.1 PG (ref 26–35)
MCHC RBC AUTO-ENTMCNC: 33.7 G/DL (ref 32–34.5)
MCV RBC AUTO: 89.2 FL (ref 80–99.9)
MONOCYTES NFR BLD: 0.44 K/UL (ref 0.1–0.95)
MONOCYTES NFR BLD: 8 % (ref 2–12)
NEUTROPHILS NFR BLD: 59 % (ref 43–80)
NEUTS SEG NFR BLD: 3.28 K/UL (ref 1.8–7.3)
PLATELET # BLD AUTO: 152 K/UL (ref 130–450)
PMV BLD AUTO: 11.2 FL (ref 7–12)
POTASSIUM SERPL-SCNC: 4 MMOL/L (ref 3.5–5)
RBC # BLD AUTO: 4.89 M/UL (ref 3.8–5.8)
SODIUM SERPL-SCNC: 136 MMOL/L (ref 132–146)
TROPONIN I SERPL HS-MCNC: <6 NG/L (ref 0–11)
WBC OTHER # BLD: 5.5 K/UL (ref 4.5–11.5)

## 2025-02-22 PROCEDURE — 80048 BASIC METABOLIC PNL TOTAL CA: CPT

## 2025-02-22 PROCEDURE — 84484 ASSAY OF TROPONIN QUANT: CPT

## 2025-02-22 PROCEDURE — 71045 X-RAY EXAM CHEST 1 VIEW: CPT

## 2025-02-22 PROCEDURE — 96374 THER/PROPH/DIAG INJ IV PUSH: CPT

## 2025-02-22 PROCEDURE — 99285 EMERGENCY DEPT VISIT HI MDM: CPT

## 2025-02-22 PROCEDURE — 96372 THER/PROPH/DIAG INJ SC/IM: CPT

## 2025-02-22 PROCEDURE — 93005 ELECTROCARDIOGRAM TRACING: CPT

## 2025-02-22 PROCEDURE — 6360000002 HC RX W HCPCS

## 2025-02-22 PROCEDURE — 85025 COMPLETE CBC W/AUTO DIFF WBC: CPT

## 2025-02-22 RX ORDER — ORPHENADRINE CITRATE 30 MG/ML
60 INJECTION INTRAMUSCULAR; INTRAVENOUS ONCE
Status: COMPLETED | OUTPATIENT
Start: 2025-02-22 | End: 2025-02-22

## 2025-02-22 RX ORDER — LIDOCAINE 50 MG/G
1 PATCH TOPICAL DAILY
Qty: 10 PATCH | Refills: 0 | Status: SHIPPED | OUTPATIENT
Start: 2025-02-22 | End: 2025-03-04

## 2025-02-22 RX ORDER — CYCLOBENZAPRINE HCL 5 MG
5 TABLET ORAL 3 TIMES DAILY PRN
Qty: 30 TABLET | Refills: 0 | Status: SHIPPED | OUTPATIENT
Start: 2025-02-22 | End: 2025-03-04

## 2025-02-22 RX ORDER — KETOROLAC TROMETHAMINE 15 MG/ML
15 INJECTION, SOLUTION INTRAMUSCULAR; INTRAVENOUS ONCE
Status: COMPLETED | OUTPATIENT
Start: 2025-02-22 | End: 2025-02-22

## 2025-02-22 RX ADMIN — KETOROLAC TROMETHAMINE 15 MG: 15 INJECTION, SOLUTION INTRAMUSCULAR; INTRAVENOUS at 08:47

## 2025-02-22 RX ADMIN — ORPHENADRINE CITRATE 60 MG: 60 INJECTION INTRAMUSCULAR; INTRAVENOUS at 08:47

## 2025-02-22 ASSESSMENT — PAIN SCALES - GENERAL: PAINLEVEL_OUTOF10: 10

## 2025-02-22 NOTE — ED PROVIDER NOTES
[facility] EMERGENCY DEPARTMENT  EMERGENCY DEPARTMENT ENCOUNTER        Pt Name: Christopher Triana  MRN: [id number]  Birthdate 1990  Date of evaluation: 2/22/2025  Provider: Laura Brandt MD  PCP: Urmila Roberts MD  Note Started: 8:38 AM EST 2/22/25    CHIEF COMPLAINT       Chief Complaint   Patient presents with    Chest Pain     Woke up this morning tightness and pain left shoulder radiating to chest real tight all the way around body       HISTORY OF PRESENT ILLNESS: 1 or more Elements   History From: Patient    Limitations to history : None    Christopher Triana is a 35 y.o. male who presents for left shoulder/scapular pain over the past 2 weeks.  The patient states he felt like he initially slept wrong and it was a muscle tightness/spasm.  He states over the past couple weeks he has just been stretching it and this has helped however when he sleeps the pain seems to come back when he stays still for long periods of time.  He states this morning he sneezed twice and the pain has come into his left chest.  He has not taken any medications for this pain.  He denies any cardiac history.  Denies history of HTN, HLD, DM.  Denies fever, coughing, runny nose, nasal congestion, abdominal pain, vomiting, diarrhea    Nursing Notes were all reviewed and agreed with or any disagreements were addressed in the HPI.        REVIEW OF EXTERNAL NOTE :       Patient was last seen on 12/18/2023 for cervicalgia, chest wall pain, neck spasm, influenza A    REVIEW OF SYSTEMS :           Positives and Pertinent negatives as per HPI.     SURGICAL HISTORY     Past Surgical History:   Procedure Laterality Date    HAND SURGERY Right 5/31/2022    RIGHT HAND EXPLORATION, FIRST WEB SPACE NEUROVASCULAR STRUCTURE WITH POSSIBLE LIGMENT AND TENDOR REPAIR performed by Dipesh Basilio MD at [facility] OR       CURRENTMEDICATIONS       Discharge Medication List as of 2/22/2025 10:29 AM        CONTINUE these medications which have  disposition:      ED Course as of 02/22/25 1504   Sat Feb 22, 2025   0822 35-year-old gentleman noncontributory past medical history presents with concern for chest tightness going to his left shoulder blade.  He notes it has been constant for the past week, nothing is made it better or worse.  He initially thought that he slept on something wrong.  He smokes marijuana otherwise denies other recreational drug use, no numbness, weakness, tingling, has not tried take anything for the pain.  No other associated complaints.    Hemodynamically stable on arrival to the emergency department, nontoxic in no acute distress.  He does have reproducible left upper trapezius/rhomboid tenderness, extremities are neurovascularly intact.  Clear breath sounds, no abdominal tenderness, physical exam otherwise unremarkable    On chart review last orthopedics for a palmar arch laceration on 9/13/2022        PERC Rule for PE for Age <50:      Age = 50 Negative    HR = 100 Negative    O2 Sat on Room Air < 95% Negative    Prior History of DVT/PE Negative    Recent Trauma or Surgery Negative    Hemoptysis Negative    Exogenous Estrogen/Hormone Use Negative    Unilateral Extgremity Swelling Negative    * If ANY Criteria are positive, the PERC rule is not satisfied and cannot be used to rule out PE in this patient.     [MB]   0843 EKG shows normal sinus rhythm at a rate of 66, normal [place] interval, normal QRS, QTc 415, T wave inversion in lead III, no significant ST elevations or depressions, does not meet STEMI criteria [KM]   1022 Patient reports improvement of his symptoms [KM]      ED Course User Index  [KM] Laura Brandt MD  [MB] Rufina Townsend DO      He is a 35-year-old male presenting for left shoulder/Scapular pain over the past 2 weeks that he believes is muscle tightness/spasm.  He states he sneezed this morning and he now has some pain in his upper left chest.  Denies other associated symptoms.  Vitals are unremarkable.  On  exam there is no tenderness to palpation however pain is reproducible with certain movements of his left arm and neck.  Differentials include but not limited to muscle spasm, ACS, musculoskeletal pain.  Patient was given Norflex and Toradol in the ED.  Blood work is unremarkable including no leukocytosis, stable hemoglobin of 14.7, no electrolyte abnormalities.  Troponin is less than 6.  EKG and chest x-ray are unremarkable.  On reexamination the patient reports improvement of his pain.  Vitals have remained stable.  Plan for discharge with PCP follow-up was discussed and he is agreeable.  He was given prescription for Flexeril and lidocaine patches.  Patient stable for discharge at this time    CONSULTS: (Who and What was discussed)  None      I am the Primary Clinician of Record.    FINAL IMPRESSION      1. Spasm of muscle    2. Left shoulder pain, unspecified chronicity          DISPOSITION/PLAN     DISPOSITION Decision To Discharge 02/22/2025 10:26:03 AM      PATIENT REFERRED TO:  Urmila Roberts MD  [address]  [address] [place] [id number]  [phone]    Schedule an appointment as soon as possible for a visit         DISCHARGE MEDICATIONS:  Discharge Medication List as of 2/22/2025 10:29 AM        START taking these medications    Details   cyclobenzaprine (FLEXERIL) 5 MG tablet Take 1 tablet by mouth 3 times daily as needed for Muscle spasms, Disp-30 tablet, R-0Normal      lidocaine (LIDODERM) 5 % Place 1 patch onto the skin daily for 10 days 12 hours on, 12 hours off., Disp-10 patch, R-0Normal             DISCONTINUED MEDICATIONS:  Discharge Medication List as of 2/22/2025 10:29 AM                 (Please note that portions of this note were completed with a voice recognition program.  Efforts were made to edit the dictations but occasionally words are mis-transcribed.)    Laura Brandt MD (electronically signed)

## 2025-07-28 ENCOUNTER — HOSPITAL ENCOUNTER (EMERGENCY)
Age: 35
Discharge: HOME | End: 2025-07-28
Payer: COMMERCIAL

## 2025-07-28 VITALS
OXYGEN SATURATION: 97 % | TEMPERATURE: 96.8 F | DIASTOLIC BLOOD PRESSURE: 77 MMHG | HEART RATE: 96 BPM | RESPIRATION RATE: 20 BRPM | SYSTOLIC BLOOD PRESSURE: 120 MMHG | BODY MASS INDEX: 35.24 KG/M2 | WEIGHT: 225 LBS

## 2025-07-28 DIAGNOSIS — L30.1 DYSHIDROTIC ECZEMA: Primary | ICD-10-CM

## 2025-07-28 PROCEDURE — 99211 OFF/OP EST MAY X REQ PHY/QHP: CPT

## 2025-07-28 RX ORDER — TRIAMCINOLONE ACETONIDE 5 MG/G
OINTMENT TOPICAL
Qty: 15 G | Refills: 1 | Status: SHIPPED | OUTPATIENT
Start: 2025-07-28 | End: 2025-08-04

## 2025-07-28 RX ORDER — PREDNISONE 50 MG/1
50 TABLET ORAL DAILY
Qty: 5 TABLET | Refills: 0 | Status: SHIPPED | OUTPATIENT
Start: 2025-07-28 | End: 2025-08-02

## 2025-07-28 ASSESSMENT — PAIN SCALES - GENERAL: PAINLEVEL_OUTOF10: 7

## 2025-07-28 ASSESSMENT — PAIN - FUNCTIONAL ASSESSMENT: PAIN_FUNCTIONAL_ASSESSMENT: 0-10

## 2025-07-28 NOTE — ED PROVIDER NOTES
Independent [facility] Visit.  HPI:  7/28/25,   Time: 7:01 PM EDT         Christopher Triana is a 35 y.o. male presenting to the urgent care for patient has a dry cracking rash on his hand that is now causing his fingers to be swollen.  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  --------------------------------------------- PAST HISTORY ---------------------------------------------  Past Medical History:  has a past medical history of Laceration of blood vessel of right thumb and Laceration of deep palmar arch of right hand.    Past Surgical History:  has a past surgical history that includes Hand surgery (Right, 5/31/2022).    Social History:  reports that he has never smoked. He has never used smokeless tobacco. He reports current alcohol use. He reports current drug use. Drug: Marijuana (Weed).    Family History: family history is not on file.     The patient’s home medications have been reviewed.    Allergies: Penicillins    -------------------------------------------------- RESULTS -------------------------------------------------  All laboratory and radiology results have been personally reviewed by myself   LABS:  No results found for this visit on 07/28/25.    RADIOLOGY:  Interpreted by Radiologist.  No orders to display       ------------------------- NURSING NOTES AND VITALS REVIEWED ---------------------------   The nursing notes within the ED encounter and vital signs as below have been reviewed.   /77   Pulse 96   Temp 96.8 °F (36 °C)   Resp 20   Wt 102.1 kg (225 lb)   SpO2 97%   BMI 35.24 kg/m²   Oxygen Saturation Interpretation: Normal      ---------------------------------------------------PHYSICAL

## 2025-07-28 NOTE — DISCHARGE INSTRUCTIONS
Claritin or Zyrtec nightly, avoid anything that is irritating the skin and avoid sweating and heat which may trigger this flareup, use gloves but make sure you are not reacting to the gloves you are using, take steroids as prescribed, use Domeboro or saline soaks 15 minutes 2-3 times a day avoid irritating soaps detergents use petroleum jelly Aquaphor CeraVe or Eucerin in between topical corticosteroid treatments

## (undated) DEVICE — SURGICAL PROCEDURE PACK HND

## (undated) DEVICE — Device

## (undated) DEVICE — SET ORTHO MINI IMPL FRAG

## (undated) DEVICE — DECANTER BAG 9": Brand: MEDLINE INDUSTRIES, INC.

## (undated) DEVICE — GLOVE ORANGE PI 8 1/2   MSG9085

## (undated) DEVICE — 4-PORT MANIFOLD: Brand: NEPTUNE 2

## (undated) DEVICE — DRILL SYSTEM 7

## (undated) DEVICE — CANNULA OPHTH 25GA 7 8IN ORNG 45DEG ANG 4MM FR END DOME SHP

## (undated) DEVICE — PADDING,UNDERCAST,COTTON, 3X4YD STERILE: Brand: MEDLINE

## (undated) DEVICE — GLOVE SURG SZ 9 L12IN FNGR THK13MIL WHT ISOLEX POLYISOPRENE

## (undated) DEVICE — TOWEL,OR,DSP,ST,BLUE,STD,6/PK,12PK/CS: Brand: MEDLINE

## (undated) DEVICE — SPONGE EYE L7CM NAT CELOS SPEAR VISITEC VISISPEAR